# Patient Record
Sex: MALE | HISPANIC OR LATINO | Employment: FULL TIME | URBAN - METROPOLITAN AREA
[De-identification: names, ages, dates, MRNs, and addresses within clinical notes are randomized per-mention and may not be internally consistent; named-entity substitution may affect disease eponyms.]

---

## 2019-05-03 ENCOUNTER — TRANSFERRED RECORDS (OUTPATIENT)
Dept: HEALTH INFORMATION MANAGEMENT | Facility: CLINIC | Age: 59
End: 2019-05-03

## 2019-05-03 LAB
ALT SERPL-CCNC: 13 IU/L
AST SERPL-CCNC: 19 IU/L (ref 5–40)
CREAT SERPL-MCNC: 1.13 MG/DL (ref 0.7–1.25)
GFR SERPL CREATININE-BSD FRML MDRD: 70 ML/MIN/1.73M2
GLUCOSE SERPL-MCNC: 80 MG/DL (ref 70–100)
POTASSIUM SERPL-SCNC: 5 MEQ/L (ref 3.5–5.3)

## 2019-05-15 ENCOUNTER — HOSPITAL ENCOUNTER (INPATIENT)
Facility: CLINIC | Age: 59
LOS: 6 days | Discharge: HOME | DRG: 329 | End: 2019-05-22
Attending: EMERGENCY MEDICINE | Admitting: SURGERY
Payer: COMMERCIAL

## 2019-05-15 ENCOUNTER — APPOINTMENT (OUTPATIENT)
Dept: CT IMAGING | Facility: CLINIC | Age: 59
DRG: 329 | End: 2019-05-15
Attending: PHYSICIAN ASSISTANT
Payer: COMMERCIAL

## 2019-05-15 ENCOUNTER — APPOINTMENT (OUTPATIENT)
Dept: ULTRASOUND IMAGING | Facility: CLINIC | Age: 59
DRG: 329 | End: 2019-05-15
Attending: PHYSICIAN ASSISTANT
Payer: COMMERCIAL

## 2019-05-15 DIAGNOSIS — K25.2 ACUTE PERFORATED GASTRIC ULCER WITH HEMORRHAGE (H): Primary | ICD-10-CM

## 2019-05-15 DIAGNOSIS — K92.2 UPPER GI BLEED: ICD-10-CM

## 2019-05-15 DIAGNOSIS — K25.5: ICD-10-CM

## 2019-05-15 LAB
ALBUMIN SERPL-MCNC: 3.2 G/DL (ref 3.4–5)
ALP SERPL-CCNC: 86 U/L (ref 40–150)
ALT SERPL W P-5'-P-CCNC: 20 U/L (ref 0–70)
ANION GAP SERPL CALCULATED.3IONS-SCNC: 7 MMOL/L (ref 3–14)
APAP SERPL-MCNC: 3 MG/L (ref 10–20)
AST SERPL W P-5'-P-CCNC: 17 U/L (ref 0–45)
BASOPHILS # BLD AUTO: 0 10E9/L (ref 0–0.2)
BASOPHILS NFR BLD AUTO: 0.3 %
BILIRUB DIRECT SERPL-MCNC: 0.1 MG/DL (ref 0–0.2)
BILIRUB SERPL-MCNC: 0.4 MG/DL (ref 0.2–1.3)
BUN SERPL-MCNC: 35 MG/DL (ref 7–30)
CALCIUM SERPL-MCNC: 8.4 MG/DL (ref 8.5–10.1)
CHLORIDE SERPL-SCNC: 107 MMOL/L (ref 94–109)
CO2 SERPL-SCNC: 23 MMOL/L (ref 20–32)
CREAT SERPL-MCNC: 1.54 MG/DL (ref 0.66–1.25)
DIFFERENTIAL METHOD BLD: ABNORMAL
EOSINOPHIL # BLD AUTO: 0 10E9/L (ref 0–0.7)
EOSINOPHIL NFR BLD AUTO: 0.4 %
ERYTHROCYTE [DISTWIDTH] IN BLOOD BY AUTOMATED COUNT: 13.7 % (ref 10–15)
GFR SERPL CREATININE-BSD FRML MDRD: 48 ML/MIN/{1.73_M2}
GLUCOSE SERPL-MCNC: 144 MG/DL (ref 70–99)
HCT VFR BLD AUTO: 22.6 % (ref 40–53)
HGB BLD-MCNC: 7 G/DL (ref 13.3–17.7)
IMM GRANULOCYTES # BLD: 0 10E9/L (ref 0–0.4)
IMM GRANULOCYTES NFR BLD: 0.3 %
LACTATE BLD-SCNC: 1.2 MMOL/L (ref 0.7–2)
LIPASE SERPL-CCNC: 195 U/L (ref 73–393)
LYMPHOCYTES # BLD AUTO: 0.8 10E9/L (ref 0.8–5.3)
LYMPHOCYTES NFR BLD AUTO: 8.5 %
MCH RBC QN AUTO: 28.6 PG (ref 26.5–33)
MCHC RBC AUTO-ENTMCNC: 31 G/DL (ref 31.5–36.5)
MCV RBC AUTO: 92 FL (ref 78–100)
MONOCYTES # BLD AUTO: 0.3 10E9/L (ref 0–1.3)
MONOCYTES NFR BLD AUTO: 3.4 %
NEUTROPHILS # BLD AUTO: 7.8 10E9/L (ref 1.6–8.3)
NEUTROPHILS NFR BLD AUTO: 87.1 %
NRBC # BLD AUTO: 0 10*3/UL
NRBC BLD AUTO-RTO: 0 /100
PLATELET # BLD AUTO: 416 10E9/L (ref 150–450)
POTASSIUM SERPL-SCNC: 4.6 MMOL/L (ref 3.4–5.3)
PROT SERPL-MCNC: 7 G/DL (ref 6.8–8.8)
RBC # BLD AUTO: 2.45 10E12/L (ref 4.4–5.9)
SODIUM SERPL-SCNC: 137 MMOL/L (ref 133–144)
TROPONIN I SERPL-MCNC: <0.015 UG/L (ref 0–0.04)
WBC # BLD AUTO: 8.9 10E9/L (ref 4–11)

## 2019-05-15 PROCEDURE — 86923 COMPATIBILITY TEST ELECTRIC: CPT | Performed by: PHYSICIAN ASSISTANT

## 2019-05-15 PROCEDURE — 96376 TX/PRO/DX INJ SAME DRUG ADON: CPT

## 2019-05-15 PROCEDURE — 71260 CT THORAX DX C+: CPT

## 2019-05-15 PROCEDURE — 83690 ASSAY OF LIPASE: CPT | Performed by: PHYSICIAN ASSISTANT

## 2019-05-15 PROCEDURE — 93005 ELECTROCARDIOGRAM TRACING: CPT

## 2019-05-15 PROCEDURE — 76705 ECHO EXAM OF ABDOMEN: CPT

## 2019-05-15 PROCEDURE — 86900 BLOOD TYPING SEROLOGIC ABO: CPT | Performed by: PHYSICIAN ASSISTANT

## 2019-05-15 PROCEDURE — 86901 BLOOD TYPING SEROLOGIC RH(D): CPT | Performed by: PHYSICIAN ASSISTANT

## 2019-05-15 PROCEDURE — 99285 EMERGENCY DEPT VISIT HI MDM: CPT | Mod: 25

## 2019-05-15 PROCEDURE — 86850 RBC ANTIBODY SCREEN: CPT | Performed by: PHYSICIAN ASSISTANT

## 2019-05-15 PROCEDURE — 96374 THER/PROPH/DIAG INJ IV PUSH: CPT | Mod: 59

## 2019-05-15 PROCEDURE — 25000128 H RX IP 250 OP 636: Performed by: PHYSICIAN ASSISTANT

## 2019-05-15 PROCEDURE — 85025 COMPLETE CBC W/AUTO DIFF WBC: CPT | Performed by: PHYSICIAN ASSISTANT

## 2019-05-15 PROCEDURE — 80076 HEPATIC FUNCTION PANEL: CPT | Performed by: PHYSICIAN ASSISTANT

## 2019-05-15 PROCEDURE — 82272 OCCULT BLD FECES 1-3 TESTS: CPT | Performed by: PHYSICIAN ASSISTANT

## 2019-05-15 PROCEDURE — 36415 COLL VENOUS BLD VENIPUNCTURE: CPT | Performed by: PHYSICIAN ASSISTANT

## 2019-05-15 PROCEDURE — 74177 CT ABD & PELVIS W/CONTRAST: CPT

## 2019-05-15 PROCEDURE — 80048 BASIC METABOLIC PNL TOTAL CA: CPT | Performed by: PHYSICIAN ASSISTANT

## 2019-05-15 PROCEDURE — 83605 ASSAY OF LACTIC ACID: CPT | Performed by: PHYSICIAN ASSISTANT

## 2019-05-15 PROCEDURE — 80329 ANALGESICS NON-OPIOID 1 OR 2: CPT | Performed by: PHYSICIAN ASSISTANT

## 2019-05-15 PROCEDURE — 96361 HYDRATE IV INFUSION ADD-ON: CPT

## 2019-05-15 PROCEDURE — 84484 ASSAY OF TROPONIN QUANT: CPT | Performed by: PHYSICIAN ASSISTANT

## 2019-05-15 PROCEDURE — 96375 TX/PRO/DX INJ NEW DRUG ADDON: CPT

## 2019-05-15 RX ORDER — ATORVASTATIN CALCIUM 20 MG/1
20 TABLET, FILM COATED ORAL DAILY
Status: ON HOLD | COMMUNITY
End: 2019-05-16

## 2019-05-15 RX ORDER — AMLODIPINE BESYLATE 5 MG/1
5 TABLET ORAL DAILY
COMMUNITY

## 2019-05-15 RX ORDER — LISINOPRIL 40 MG/1
40 TABLET ORAL DAILY
Status: ON HOLD | COMMUNITY
End: 2019-05-22

## 2019-05-15 RX ORDER — ONDANSETRON 2 MG/ML
4 INJECTION INTRAMUSCULAR; INTRAVENOUS ONCE
Status: COMPLETED | OUTPATIENT
Start: 2019-05-15 | End: 2019-05-15

## 2019-05-15 RX ORDER — ACETAMINOPHEN 500 MG
500 TABLET ORAL EVERY 6 HOURS PRN
Status: ON HOLD | COMMUNITY
End: 2019-05-16

## 2019-05-15 RX ORDER — HYDROMORPHONE HYDROCHLORIDE 1 MG/ML
0.5 INJECTION, SOLUTION INTRAMUSCULAR; INTRAVENOUS; SUBCUTANEOUS ONCE
Status: COMPLETED | OUTPATIENT
Start: 2019-05-15 | End: 2019-05-15

## 2019-05-15 RX ADMIN — SODIUM CHLORIDE 1000 ML: 9 INJECTION, SOLUTION INTRAVENOUS at 22:44

## 2019-05-15 RX ADMIN — Medication 0.5 MG: at 22:49

## 2019-05-15 RX ADMIN — ONDANSETRON 4 MG: 2 INJECTION INTRAMUSCULAR; INTRAVENOUS at 22:46

## 2019-05-15 RX ADMIN — Medication 0.5 MG: at 23:42

## 2019-05-15 ASSESSMENT — ENCOUNTER SYMPTOMS
DIAPHORESIS: 0
NAUSEA: 0
ABDOMINAL PAIN: 1
COUGH: 0
SHORTNESS OF BREATH: 1
FEVER: 0
DIARRHEA: 0
MYALGIAS: 1
VOMITING: 0

## 2019-05-15 NOTE — LETTER
[address] , [address]  [place] [address]  [phone]  F: [phone]    [email or web address]  [email or web address]  [email or web address]               May 22, 2019    RE: Manny Manuel  :  1960      This is to certify that Manny Manuel has been under my care for surgery.  Please excuse him from work starting 19.    He will be able to return to work in approximately 4 weeks.  Please fax any necessary paperwork to our clinic:    Mat Castro MD  Fax:  [phone]      Sincerely,          Kirsten Ortega PA-C

## 2019-05-15 NOTE — Clinical Note
This patient and his wife work at the same place of employment.  Please fax this letter to their work place:    [employer]  Fax:  [phone]  Attn:  Jian Renner

## 2019-05-15 NOTE — LETTER
[address] , [address]  [place] [address]  [phone]  F: [phone]    [email or web address]  [email or web address]  [email or web address]               May 20, 2019    RE: Manny Manuel  :  1960      This is to certify that Manny Manuel has been under my care for emergency surgery.  Please excuse patient's wife (Milagros Elkins) from work starting 19 through today 19 as she was needed to help care for her .      Manny Manuel will be able to return to work in approximately 4 weeks.  Please fax any necessary paperwork to our clinic:    Mat Castro MD  Fax:  [phone]        Sincerely,        Kirsten Ortega PA-C

## 2019-05-16 ENCOUNTER — APPOINTMENT (OUTPATIENT)
Dept: GENERAL RADIOLOGY | Facility: CLINIC | Age: 59
DRG: 329 | End: 2019-05-16
Attending: PHYSICIAN ASSISTANT
Payer: COMMERCIAL

## 2019-05-16 ENCOUNTER — ANESTHESIA (OUTPATIENT)
Dept: SURGERY | Facility: CLINIC | Age: 59
DRG: 329 | End: 2019-05-16
Payer: COMMERCIAL

## 2019-05-16 ENCOUNTER — OFFICE VISIT (OUTPATIENT)
Dept: INTERPRETER SERVICES | Facility: CLINIC | Age: 59
End: 2019-05-16
Payer: COMMERCIAL

## 2019-05-16 ENCOUNTER — ANESTHESIA EVENT (OUTPATIENT)
Dept: SURGERY | Facility: CLINIC | Age: 59
DRG: 329 | End: 2019-05-16
Payer: COMMERCIAL

## 2019-05-16 PROBLEM — K25.2: Status: ACTIVE | Noted: 2019-05-16

## 2019-05-16 LAB
ABO + RH BLD: NORMAL
ABO + RH BLD: NORMAL
ANION GAP SERPL CALCULATED.3IONS-SCNC: 6 MMOL/L (ref 3–14)
BLD GP AB SCN SERPL QL: NORMAL
BLD PROD TYP BPU: NORMAL
BLOOD BANK CMNT PATIENT-IMP: NORMAL
BUN SERPL-MCNC: 24 MG/DL (ref 7–30)
CALCIUM SERPL-MCNC: 7.6 MG/DL (ref 8.5–10.1)
CHLORIDE SERPL-SCNC: 112 MMOL/L (ref 94–109)
CO2 SERPL-SCNC: 23 MMOL/L (ref 20–32)
CREAT SERPL-MCNC: 1.32 MG/DL (ref 0.66–1.25)
GFR SERPL CREATININE-BSD FRML MDRD: 58 ML/MIN/{1.73_M2}
GLUCOSE BLDC GLUCOMTR-MCNC: 105 MG/DL (ref 70–99)
GLUCOSE BLDC GLUCOMTR-MCNC: 107 MG/DL (ref 70–99)
GLUCOSE BLDC GLUCOMTR-MCNC: 125 MG/DL (ref 70–99)
GLUCOSE BLDC GLUCOMTR-MCNC: 131 MG/DL (ref 70–99)
GLUCOSE SERPL-MCNC: 143 MG/DL (ref 70–99)
HBA1C MFR BLD: 5.2 % (ref 0–5.6)
HEMOCCULT STL QL: POSITIVE
HGB BLD-MCNC: 7.5 G/DL (ref 13.3–17.7)
INTERPRETATION ECG - MUSE: NORMAL
NUM BPU REQUESTED: 2
POTASSIUM SERPL-SCNC: 4.6 MMOL/L (ref 3.4–5.3)
RADIOLOGIST FLAGS: ABNORMAL
SODIUM SERPL-SCNC: 141 MMOL/L (ref 133–144)
SPECIMEN EXP DATE BLD: NORMAL

## 2019-05-16 PROCEDURE — 25000128 H RX IP 250 OP 636: Performed by: EMERGENCY MEDICINE

## 2019-05-16 PROCEDURE — 99207 ZZC APP CREDIT; MD BILLING SHARED VISIT: CPT | Performed by: PHYSICIAN ASSISTANT

## 2019-05-16 PROCEDURE — 12000000 ZZH R&B MED SURG/OB

## 2019-05-16 PROCEDURE — 71000013 ZZH RECOVERY PHASE 1 LEVEL 1 EA ADDTL HR: Performed by: SURGERY

## 2019-05-16 PROCEDURE — 25000128 H RX IP 250 OP 636: Performed by: SURGERY

## 2019-05-16 PROCEDURE — 00000146 ZZHCL STATISTIC GLUCOSE BY METER IP

## 2019-05-16 PROCEDURE — 0DU907Z SUPPLEMENT DUODENUM WITH AUTOLOGOUS TISSUE SUBSTITUTE, OPEN APPROACH: ICD-10-PCS | Performed by: SURGERY

## 2019-05-16 PROCEDURE — C9113 INJ PANTOPRAZOLE SODIUM, VIA: HCPCS | Performed by: SURGERY

## 2019-05-16 PROCEDURE — 43840 GSTRRPHY SUTR DUOL/GSTR ULCR: CPT | Performed by: SURGERY

## 2019-05-16 PROCEDURE — 25800030 ZZH RX IP 258 OP 636: Performed by: SURGERY

## 2019-05-16 PROCEDURE — 71000012 ZZH RECOVERY PHASE 1 LEVEL 1 FIRST HR: Performed by: SURGERY

## 2019-05-16 PROCEDURE — 99222 1ST HOSP IP/OBS MODERATE 55: CPT | Performed by: INTERNAL MEDICINE

## 2019-05-16 PROCEDURE — 83036 HEMOGLOBIN GLYCOSYLATED A1C: CPT | Performed by: PHYSICIAN ASSISTANT

## 2019-05-16 PROCEDURE — 43752 NASAL/OROGASTRIC W/TUBE PLMT: CPT

## 2019-05-16 PROCEDURE — P9016 RBC LEUKOCYTES REDUCED: HCPCS | Performed by: PHYSICIAN ASSISTANT

## 2019-05-16 PROCEDURE — 37000009 ZZH ANESTHESIA TECHNICAL FEE, EACH ADDTL 15 MIN: Performed by: SURGERY

## 2019-05-16 PROCEDURE — 96375 TX/PRO/DX INJ NEW DRUG ADDON: CPT

## 2019-05-16 PROCEDURE — T1013 SIGN LANG/ORAL INTERPRETER: HCPCS | Mod: U3

## 2019-05-16 PROCEDURE — 27210794 ZZH OR GENERAL SUPPLY STERILE: Performed by: SURGERY

## 2019-05-16 PROCEDURE — 40000306 ZZH STATISTIC PRE PROC ASSESS II: Performed by: SURGERY

## 2019-05-16 PROCEDURE — 27211024 ZZHC OR SUPPLY OTHER OPNP: Performed by: SURGERY

## 2019-05-16 PROCEDURE — 36000056 ZZH SURGERY LEVEL 3 1ST 30 MIN: Performed by: SURGERY

## 2019-05-16 PROCEDURE — 36000058 ZZH SURGERY LEVEL 3 EA 15 ADDTL MIN: Performed by: SURGERY

## 2019-05-16 PROCEDURE — 25000125 ZZHC RX 250: Performed by: NURSE ANESTHETIST, CERTIFIED REGISTERED

## 2019-05-16 PROCEDURE — 43840 GSTRRPHY SUTR DUOL/GSTR ULCR: CPT | Mod: AS | Performed by: PHYSICIAN ASSISTANT

## 2019-05-16 PROCEDURE — 25800030 ZZH RX IP 258 OP 636: Performed by: NURSE ANESTHETIST, CERTIFIED REGISTERED

## 2019-05-16 PROCEDURE — C9113 INJ PANTOPRAZOLE SODIUM, VIA: HCPCS | Performed by: EMERGENCY MEDICINE

## 2019-05-16 PROCEDURE — 25000128 H RX IP 250 OP 636: Performed by: NURSE ANESTHETIST, CERTIFIED REGISTERED

## 2019-05-16 PROCEDURE — 80048 BASIC METABOLIC PNL TOTAL CA: CPT | Performed by: PHYSICIAN ASSISTANT

## 2019-05-16 PROCEDURE — 25800030 ZZH RX IP 258 OP 636: Performed by: ANESTHESIOLOGY

## 2019-05-16 PROCEDURE — 25000125 ZZHC RX 250

## 2019-05-16 PROCEDURE — 99221 1ST HOSP IP/OBS SF/LOW 40: CPT | Mod: 57 | Performed by: SURGERY

## 2019-05-16 PROCEDURE — 25000128 H RX IP 250 OP 636: Performed by: PHYSICIAN ASSISTANT

## 2019-05-16 PROCEDURE — 36415 COLL VENOUS BLD VENIPUNCTURE: CPT | Performed by: PHYSICIAN ASSISTANT

## 2019-05-16 PROCEDURE — 96374 THER/PROPH/DIAG INJ IV PUSH: CPT | Mod: 59

## 2019-05-16 PROCEDURE — 99207 ZZC DOWN CODE DUE TO INITIAL EXAM: CPT | Performed by: INTERNAL MEDICINE

## 2019-05-16 PROCEDURE — 25000125 ZZHC RX 250: Performed by: SURGERY

## 2019-05-16 PROCEDURE — 85018 HEMOGLOBIN: CPT | Performed by: PHYSICIAN ASSISTANT

## 2019-05-16 PROCEDURE — 25800030 ZZH RX IP 258 OP 636: Performed by: EMERGENCY MEDICINE

## 2019-05-16 PROCEDURE — 37000008 ZZH ANESTHESIA TECHNICAL FEE, 1ST 30 MIN: Performed by: SURGERY

## 2019-05-16 RX ORDER — ONDANSETRON 4 MG/1
4 TABLET, ORALLY DISINTEGRATING ORAL EVERY 6 HOURS PRN
Status: DISCONTINUED | OUTPATIENT
Start: 2019-05-16 | End: 2019-05-22 | Stop reason: HOSPADM

## 2019-05-16 RX ORDER — FENTANYL CITRATE 50 UG/ML
25-50 INJECTION, SOLUTION INTRAMUSCULAR; INTRAVENOUS
Status: DISCONTINUED | OUTPATIENT
Start: 2019-05-16 | End: 2019-05-16 | Stop reason: HOSPADM

## 2019-05-16 RX ORDER — SODIUM CHLORIDE, SODIUM LACTATE, POTASSIUM CHLORIDE, CALCIUM CHLORIDE 600; 310; 30; 20 MG/100ML; MG/100ML; MG/100ML; MG/100ML
INJECTION, SOLUTION INTRAVENOUS CONTINUOUS
Status: DISCONTINUED | OUTPATIENT
Start: 2019-05-16 | End: 2019-05-16 | Stop reason: HOSPADM

## 2019-05-16 RX ORDER — PROCHLORPERAZINE MALEATE 10 MG
10 TABLET ORAL EVERY 6 HOURS PRN
Status: DISCONTINUED | OUTPATIENT
Start: 2019-05-16 | End: 2019-05-22 | Stop reason: HOSPADM

## 2019-05-16 RX ORDER — SODIUM CHLORIDE 9 MG/ML
INJECTION, SOLUTION INTRAVENOUS CONTINUOUS
Status: DISCONTINUED | OUTPATIENT
Start: 2019-05-16 | End: 2019-05-19

## 2019-05-16 RX ORDER — PROPOFOL 10 MG/ML
INJECTION, EMULSION INTRAVENOUS PRN
Status: DISCONTINUED | OUTPATIENT
Start: 2019-05-16 | End: 2019-05-16

## 2019-05-16 RX ORDER — KETAMINE HYDROCHLORIDE 10 MG/ML
INJECTION, SOLUTION INTRAMUSCULAR; INTRAVENOUS PRN
Status: DISCONTINUED | OUTPATIENT
Start: 2019-05-16 | End: 2019-05-16

## 2019-05-16 RX ORDER — MEPERIDINE HYDROCHLORIDE 50 MG/ML
12.5 INJECTION INTRAMUSCULAR; INTRAVENOUS; SUBCUTANEOUS EVERY 5 MIN PRN
Status: DISCONTINUED | OUTPATIENT
Start: 2019-05-16 | End: 2019-05-16 | Stop reason: HOSPADM

## 2019-05-16 RX ORDER — IOPAMIDOL 755 MG/ML
500 INJECTION, SOLUTION INTRAVASCULAR ONCE
Status: COMPLETED | OUTPATIENT
Start: 2019-05-16 | End: 2019-05-16

## 2019-05-16 RX ORDER — ONDANSETRON 4 MG/1
4 TABLET, ORALLY DISINTEGRATING ORAL EVERY 30 MIN PRN
Status: DISCONTINUED | OUTPATIENT
Start: 2019-05-16 | End: 2019-05-16 | Stop reason: HOSPADM

## 2019-05-16 RX ORDER — DEXAMETHASONE SODIUM PHOSPHATE 4 MG/ML
INJECTION, SOLUTION INTRA-ARTICULAR; INTRALESIONAL; INTRAMUSCULAR; INTRAVENOUS; SOFT TISSUE PRN
Status: DISCONTINUED | OUTPATIENT
Start: 2019-05-16 | End: 2019-05-16

## 2019-05-16 RX ORDER — NALOXONE HYDROCHLORIDE 0.4 MG/ML
.1-.4 INJECTION, SOLUTION INTRAMUSCULAR; INTRAVENOUS; SUBCUTANEOUS
Status: DISCONTINUED | OUTPATIENT
Start: 2019-05-16 | End: 2019-05-22 | Stop reason: HOSPADM

## 2019-05-16 RX ORDER — LIDOCAINE 40 MG/G
CREAM TOPICAL
Status: DISCONTINUED | OUTPATIENT
Start: 2019-05-16 | End: 2019-05-16 | Stop reason: HOSPADM

## 2019-05-16 RX ORDER — NEOSTIGMINE METHYLSULFATE 1 MG/ML
VIAL (ML) INJECTION PRN
Status: DISCONTINUED | OUTPATIENT
Start: 2019-05-16 | End: 2019-05-16

## 2019-05-16 RX ORDER — DEXTROSE MONOHYDRATE 25 G/50ML
25-50 INJECTION, SOLUTION INTRAVENOUS
Status: DISCONTINUED | OUTPATIENT
Start: 2019-05-16 | End: 2019-05-22 | Stop reason: HOSPADM

## 2019-05-16 RX ORDER — LIDOCAINE HYDROCHLORIDE 20 MG/ML
JELLY TOPICAL ONCE
Status: COMPLETED | OUTPATIENT
Start: 2019-05-16 | End: 2019-05-16

## 2019-05-16 RX ORDER — HYDROMORPHONE HYDROCHLORIDE 1 MG/ML
.3-.5 INJECTION, SOLUTION INTRAMUSCULAR; INTRAVENOUS; SUBCUTANEOUS EVERY 5 MIN PRN
Status: DISCONTINUED | OUTPATIENT
Start: 2019-05-16 | End: 2019-05-16 | Stop reason: HOSPADM

## 2019-05-16 RX ORDER — GLYCOPYRROLATE 0.2 MG/ML
INJECTION, SOLUTION INTRAMUSCULAR; INTRAVENOUS PRN
Status: DISCONTINUED | OUTPATIENT
Start: 2019-05-16 | End: 2019-05-16

## 2019-05-16 RX ORDER — ONDANSETRON 2 MG/ML
4 INJECTION INTRAMUSCULAR; INTRAVENOUS EVERY 30 MIN PRN
Status: DISCONTINUED | OUTPATIENT
Start: 2019-05-16 | End: 2019-05-16 | Stop reason: HOSPADM

## 2019-05-16 RX ORDER — NAPROXEN SODIUM 220 MG
440 TABLET ORAL DAILY PRN
Status: ON HOLD | COMMUNITY
End: 2019-05-22

## 2019-05-16 RX ORDER — LIDOCAINE 40 MG/G
CREAM TOPICAL
Status: DISCONTINUED | OUTPATIENT
Start: 2019-05-16 | End: 2019-05-22 | Stop reason: HOSPADM

## 2019-05-16 RX ORDER — ONDANSETRON 2 MG/ML
4 INJECTION INTRAMUSCULAR; INTRAVENOUS EVERY 6 HOURS PRN
Status: DISCONTINUED | OUTPATIENT
Start: 2019-05-16 | End: 2019-05-22 | Stop reason: HOSPADM

## 2019-05-16 RX ORDER — FENTANYL CITRATE 50 UG/ML
INJECTION, SOLUTION INTRAMUSCULAR; INTRAVENOUS PRN
Status: DISCONTINUED | OUTPATIENT
Start: 2019-05-16 | End: 2019-05-16

## 2019-05-16 RX ORDER — ONDANSETRON 2 MG/ML
INJECTION INTRAMUSCULAR; INTRAVENOUS PRN
Status: DISCONTINUED | OUTPATIENT
Start: 2019-05-16 | End: 2019-05-16

## 2019-05-16 RX ORDER — PANTOPRAZOLE SODIUM 40 MG/10ML
40 INJECTION, POWDER, LYOPHILIZED, FOR SOLUTION INTRAVENOUS EVERY 12 HOURS
Status: DISCONTINUED | OUTPATIENT
Start: 2019-05-16 | End: 2019-05-22

## 2019-05-16 RX ORDER — METFORMIN HCL 500 MG
500 TABLET, EXTENDED RELEASE 24 HR ORAL DAILY
COMMUNITY

## 2019-05-16 RX ORDER — HYDRALAZINE HYDROCHLORIDE 20 MG/ML
2.5-5 INJECTION INTRAMUSCULAR; INTRAVENOUS EVERY 10 MIN PRN
Status: DISCONTINUED | OUTPATIENT
Start: 2019-05-16 | End: 2019-05-16 | Stop reason: HOSPADM

## 2019-05-16 RX ORDER — DIPHENHYDRAMINE HCL 25 MG
25 CAPSULE ORAL EVERY 6 HOURS PRN
Status: DISCONTINUED | OUTPATIENT
Start: 2019-05-16 | End: 2019-05-22 | Stop reason: HOSPADM

## 2019-05-16 RX ORDER — NALOXONE HYDROCHLORIDE 0.4 MG/ML
.1-.4 INJECTION, SOLUTION INTRAMUSCULAR; INTRAVENOUS; SUBCUTANEOUS
Status: DISCONTINUED | OUTPATIENT
Start: 2019-05-16 | End: 2019-05-16

## 2019-05-16 RX ORDER — MAGNESIUM HYDROXIDE 1200 MG/15ML
LIQUID ORAL PRN
Status: DISCONTINUED | OUTPATIENT
Start: 2019-05-16 | End: 2019-05-16 | Stop reason: HOSPADM

## 2019-05-16 RX ORDER — LIDOCAINE HYDROCHLORIDE 20 MG/ML
JELLY TOPICAL
Status: COMPLETED
Start: 2019-05-16 | End: 2019-05-16

## 2019-05-16 RX ORDER — DIPHENHYDRAMINE HYDROCHLORIDE 50 MG/ML
25 INJECTION INTRAMUSCULAR; INTRAVENOUS EVERY 6 HOURS PRN
Status: DISCONTINUED | OUTPATIENT
Start: 2019-05-16 | End: 2019-05-22 | Stop reason: HOSPADM

## 2019-05-16 RX ORDER — LIDOCAINE HYDROCHLORIDE 10 MG/ML
INJECTION, SOLUTION INFILTRATION; PERINEURAL PRN
Status: DISCONTINUED | OUTPATIENT
Start: 2019-05-16 | End: 2019-05-16

## 2019-05-16 RX ORDER — EPHEDRINE SULFATE 50 MG/ML
INJECTION, SOLUTION INTRAMUSCULAR; INTRAVENOUS; SUBCUTANEOUS PRN
Status: DISCONTINUED | OUTPATIENT
Start: 2019-05-16 | End: 2019-05-16

## 2019-05-16 RX ORDER — NICOTINE POLACRILEX 4 MG
15-30 LOZENGE BUCCAL
Status: DISCONTINUED | OUTPATIENT
Start: 2019-05-16 | End: 2019-05-22 | Stop reason: HOSPADM

## 2019-05-16 RX ORDER — HYDRALAZINE HYDROCHLORIDE 20 MG/ML
10 INJECTION INTRAMUSCULAR; INTRAVENOUS EVERY 4 HOURS PRN
Status: DISCONTINUED | OUTPATIENT
Start: 2019-05-16 | End: 2019-05-21

## 2019-05-16 RX ORDER — LABETALOL 20 MG/4 ML (5 MG/ML) INTRAVENOUS SYRINGE
10
Status: DISCONTINUED | OUTPATIENT
Start: 2019-05-16 | End: 2019-05-16 | Stop reason: HOSPADM

## 2019-05-16 RX ADMIN — SUGAMMADEX 200 MG: 100 INJECTION, SOLUTION INTRAVENOUS at 03:37

## 2019-05-16 RX ADMIN — PROPOFOL 50 MG: 10 INJECTION, EMULSION INTRAVENOUS at 03:15

## 2019-05-16 RX ADMIN — ONDANSETRON 4 MG: 2 INJECTION INTRAMUSCULAR; INTRAVENOUS at 03:06

## 2019-05-16 RX ADMIN — LIDOCAINE HYDROCHLORIDE 30 MG: 10 INJECTION, SOLUTION INFILTRATION; PERINEURAL at 02:10

## 2019-05-16 RX ADMIN — MIDAZOLAM 2 MG: 1 INJECTION INTRAMUSCULAR; INTRAVENOUS at 02:05

## 2019-05-16 RX ADMIN — TAZOBACTAM SODIUM AND PIPERACILLIN SODIUM 3.38 G: 375; 3 INJECTION, SOLUTION INTRAVENOUS at 06:20

## 2019-05-16 RX ADMIN — TAZOBACTAM SODIUM AND PIPERACILLIN SODIUM 3.38 G: 375; 3 INJECTION, SOLUTION INTRAVENOUS at 14:16

## 2019-05-16 RX ADMIN — KETAMINE HYDROCHLORIDE 30 MG: 10 INJECTION, SOLUTION INTRAMUSCULAR; INTRAVENOUS at 03:09

## 2019-05-16 RX ADMIN — Medication 3 MG: at 03:24

## 2019-05-16 RX ADMIN — ROCURONIUM BROMIDE 50 MG: 10 INJECTION INTRAVENOUS at 02:10

## 2019-05-16 RX ADMIN — GLYCOPYRROLATE 0.2 MG: 0.2 INJECTION, SOLUTION INTRAMUSCULAR; INTRAVENOUS at 02:10

## 2019-05-16 RX ADMIN — DIATRIZOATE MEGLUMINE AND DIATRIZOATE SODIUM 20 ML: 660; 100 SOLUTION ORAL; RECTAL at 12:50

## 2019-05-16 RX ADMIN — TAZOBACTAM SODIUM AND PIPERACILLIN SODIUM 3.38 G: 375; 3 INJECTION, SOLUTION INTRAVENOUS at 01:20

## 2019-05-16 RX ADMIN — PROPOFOL 150 MG: 10 INJECTION, EMULSION INTRAVENOUS at 02:10

## 2019-05-16 RX ADMIN — SODIUM CHLORIDE 80 MG: 9 INJECTION, SOLUTION INTRAVENOUS at 00:29

## 2019-05-16 RX ADMIN — SODIUM CHLORIDE: 9 INJECTION, SOLUTION INTRAVENOUS at 06:06

## 2019-05-16 RX ADMIN — LIDOCAINE HYDROCHLORIDE 1 TUBE: 20 JELLY TOPICAL at 12:49

## 2019-05-16 RX ADMIN — PANTOPRAZOLE SODIUM 40 MG: 40 INJECTION, POWDER, FOR SOLUTION INTRAVENOUS at 11:17

## 2019-05-16 RX ADMIN — TAZOBACTAM SODIUM AND PIPERACILLIN SODIUM 3.38 G: 375; 3 INJECTION, SOLUTION INTRAVENOUS at 19:41

## 2019-05-16 RX ADMIN — SODIUM CHLORIDE 60 ML: 9 INJECTION, SOLUTION INTRAVENOUS at 00:16

## 2019-05-16 RX ADMIN — IOPAMIDOL 80 ML: 755 INJECTION, SOLUTION INTRAVENOUS at 00:16

## 2019-05-16 RX ADMIN — DEXAMETHASONE SODIUM PHOSPHATE 4 MG: 4 INJECTION, SOLUTION INTRA-ARTICULAR; INTRALESIONAL; INTRAMUSCULAR; INTRAVENOUS; SOFT TISSUE at 02:10

## 2019-05-16 RX ADMIN — SODIUM CHLORIDE, POTASSIUM CHLORIDE, SODIUM LACTATE AND CALCIUM CHLORIDE: 600; 310; 30; 20 INJECTION, SOLUTION INTRAVENOUS at 02:05

## 2019-05-16 RX ADMIN — SODIUM CHLORIDE 8 MG/HR: 9 INJECTION, SOLUTION INTRAVENOUS at 01:16

## 2019-05-16 RX ADMIN — Medication 7.5 MG: at 02:25

## 2019-05-16 RX ADMIN — PHENYLEPHRINE HYDROCHLORIDE 100 MCG: 10 INJECTION, SOLUTION INTRAMUSCULAR; INTRAVENOUS; SUBCUTANEOUS at 02:28

## 2019-05-16 RX ADMIN — FENTANYL CITRATE 100 MCG: 50 INJECTION, SOLUTION INTRAMUSCULAR; INTRAVENOUS at 02:10

## 2019-05-16 RX ADMIN — Medication: at 05:11

## 2019-05-16 RX ADMIN — GLYCOPYRROLATE 0.4 MG: 0.2 INJECTION, SOLUTION INTRAMUSCULAR; INTRAVENOUS at 03:24

## 2019-05-16 RX ADMIN — SODIUM CHLORIDE: 9 INJECTION, SOLUTION INTRAVENOUS at 16:49

## 2019-05-16 RX ADMIN — FENTANYL CITRATE 100 MCG: 50 INJECTION, SOLUTION INTRAMUSCULAR; INTRAVENOUS at 02:31

## 2019-05-16 ASSESSMENT — ACTIVITIES OF DAILY LIVING (ADL)
SWALLOWING: 0-->SWALLOWS FOODS/LIQUIDS WITHOUT DIFFICULTY
ADLS_ACUITY_SCORE: 12
ADLS_ACUITY_SCORE: 12
FALL_HISTORY_WITHIN_LAST_SIX_MONTHS: NO
RETIRED_EATING: 0-->INDEPENDENT
BATHING: 0-->INDEPENDENT
TOILETING: 0-->INDEPENDENT
ADLS_ACUITY_SCORE: 10
TRANSFERRING: 0-->INDEPENDENT
AMBULATION: 0-->INDEPENDENT
COGNITION: 0 - NO COGNITION ISSUES REPORTED
RETIRED_COMMUNICATION: 0-->UNDERSTANDS/COMMUNICATES WITHOUT DIFFICULTY
ADLS_ACUITY_SCORE: 12
DRESS: 0-->INDEPENDENT

## 2019-05-16 NOTE — ANESTHESIA CARE TRANSFER NOTE
Patient: Manny Manuel    Procedure(s):  LAPAROTOMY, EXPLORATORY , over sewn and patching of gastric ulcer    Diagnosis: hoa ulcer  Diagnosis Additional Information: No value filed.    Anesthesia Type:   General, RSI, ETT     Note:  Airway :Face Mask  Patient transferred to:PACU  Handoff Report: Identifed the Patient, Identified the Reponsible Provider, Reviewed the pertinent medical history, Discussed the surgical course, Reviewed Intra-OP anesthesia mangement and issues during anesthesia, Set expectations for post-procedure period and Allowed opportunity for questions and acknowledgement of understanding      Vitals: (Last set prior to Anesthesia Care Transfer)    CRNA VITALS  5/16/2019 0309 - 5/16/2019 0348      5/16/2019             Pulse:  114    SpO2:  100 %    Resp Rate (observed):  21                Electronically Signed By: Dean Dennis Severson, APRN CRNA  May 16, 2019  3:48 AM

## 2019-05-16 NOTE — ANESTHESIA PREPROCEDURE EVALUATION
Anesthesia Pre-Procedure Evaluation    Patient: Manny Manuel   MRN: [id number] : 1960          Preoperative Diagnosis: hoa ulcer    Procedure(s):  LAPAROTOMY, EXPLORATORY    Past Medical History:   Diagnosis Date     Diabetes (H)      Hypertension      History reviewed. No pertinent surgical history.  Anesthesia Evaluation     . Pt has had prior anesthetic. Type: General    No history of anesthetic complications          ROS/MED HX    ENT/Pulmonary:  - neg pulmonary ROS     Neurologic:  - neg neurologic ROS     Cardiovascular:     (+) hypertension----. : . . . :. .       METS/Exercise Tolerance:     Hematologic:     (+) Anemia, -      Musculoskeletal:  - neg musculoskeletal ROS       GI/Hepatic:  - neg GI/hepatic ROS       Renal/Genitourinary:  - ROS Renal section negative       Endo:     (+) type II DM Not using insulin Obesity, .      Psychiatric:  - neg psychiatric ROS       Infectious Disease:  - neg infectious disease ROS       Malignancy:         Other:    - neg other ROS                      Physical Exam  Normal systems: cardiovascular, pulmonary and dental    Airway   Mallampati: II  TM distance: >3 FB  Neck ROM: full    Dental     Cardiovascular       Pulmonary             Lab Results   Component Value Date    WBC 8.9 05/15/2019    HGB 7.0 (L) 05/15/2019    HCT 22.6 (L) 05/15/2019     05/15/2019     05/15/2019    POTASSIUM 4.6 05/15/2019    CHLORIDE 107 05/15/2019    CO2 23 05/15/2019    BUN 35 (H) 05/15/2019    CR 1.54 (H) 05/15/2019     (H) 05/15/2019    LU 8.4 (L) 05/15/2019    ALBUMIN 3.2 (L) 05/15/2019    PROTTOTAL 7.0 05/15/2019    ALT 20 05/15/2019    AST 17 05/15/2019    ALKPHOS 86 05/15/2019    BILITOTAL 0.4 05/15/2019    LIPASE 195 05/15/2019       Preop Vitals  BP Readings from Last 3 Encounters:   19 142/74    Pulse Readings from Last 3 Encounters:   19 93      Resp Readings from Last 3 Encounters:   05/15/19 18    SpO2 Readings from Last 3 Encounters:    05/16/19 93%      Temp Readings from Last 1 Encounters:   05/15/19 97.8  F (36.6  C)    Ht Readings from Last 1 Encounters:   No data found for Ht      Wt Readings from Last 1 Encounters:   No data found for Wt    There is no height or weight on file to calculate BMI.       Anesthesia Plan      History & Physical Review  History and physical reviewed and following examination; no interval change.    ASA Status:  3 emergent.    NPO Status:  Waived due to emergency    Plan for General, RSI and ETT with Intravenous induction. Maintenance will be Balanced.    PONV prophylaxis:  Ondansetron (or other 5HT-3) and Dexamethasone or Solumedrol       Postoperative Care  Postoperative pain management:  IV analgesics, Oral pain medications and Multi-modal analgesia.      Consents  Anesthetic plan, risks, benefits and alternatives discussed with:  Patient..                 Marquez Holley MD                    .

## 2019-05-16 NOTE — ED PROVIDER NOTES
History     Chief Complaint:  Abdominal pain and chest pain    VITA Manuel is a 59 year old male with a history of hypertension and diabetes who presents to the ED for evaluation of abdominal pain and chest pain. The patient reports an onset of RUQ abdominal pain 10 days ago. He does have a gallbladder ultrasound scheduled for Friday but today the pain became unbearable under his ribs. Throughout the day, the pain has progressed and he is now reporting having pain everywhere throughout the chest and abdomen and is feeling short of breath. His skin is more yellow than normal today as well. He denies any fevers, diaphoresis, nausea, vomiting, diarrhea, cough, leg swelling, or any other symptoms. Denies any alcohol use though notes he was a heavy alcohol user years ago.  He does note that his stools have been darker than usual lately.  The patient is not on blood thinners.      CARDIAC RISK FACTORS:  Sex:    Male  Tobacco:   No  Hypertension:   Yes  Hyperlipidemia:  No  Diabetes:   Yes  Family History:  No    PE/DVT RISK FACTORS:  Sex:    Male  Hormones:   No  Tobacco:   No  Cancer:   No  Travel:   No  Surgery:   No  Other immobilization: No  Personal history:  No  Family history:  No    Allergies:  No known drug allergies    Medications:    Norvasc  Lipitor  Lisinopril     Past Medical History:    Diabetes   Hypertension     Past Surgical History:    History reviewed. No pertinent surgical history.    Family History:    History reviewed. No pertinent family history.     Social History:  Presents to the ED with his spouse.     Review of Systems   Constitutional: Negative for diaphoresis and fever.   Respiratory: Positive for shortness of breath. Negative for cough.    Cardiovascular: Positive for chest pain. Negative for leg swelling.   Gastrointestinal: Positive for abdominal pain. Negative for diarrhea, nausea and vomiting.   Musculoskeletal: Positive for myalgias.   All other systems reviewed and are  negative.      Physical Exam   Patient Vitals for the past 24 hrs:   BP Temp Pulse Heart Rate Resp SpO2   05/15/19 2300 144/72 -- 90 92 -- 98 %   05/15/19 2245 136/74 -- 85 77 -- 92 %   05/15/19 2230 123/66 -- 82 77 -- --   05/15/19 2215 -- -- -- 79 -- 99 %   05/15/19 2200 126/62 -- -- -- -- --   05/15/19 2154 (!) 83/48 97.8  F (36.6  C) 76 76 18 100 %     Physical Exam  General: Ill-appearing.  Lying on comfortably on gurney.  Skin pale and yellow appearing.  Head:  Scalp is NC/AT  Eyes:  No scleral icterus, PERRL  ENT:  The external nose and ears are normal.   Neck:  Normal range of motion without rigidity.  CV:  Regular rate and rhythm    No pathologic murmur, rubs, or gallops.  Resp:  Breath sounds are clear bilaterally.  No crackles, wheezes, rhonchi.    Non-labored, no retractions or accessory muscle use  GI:  Abdomen is tender to palpation in the epigastric and right upper quadrant areas.  There is some rebound tenderness and guarding.  Bowel sounds present in all quadrants.  Rectal examination performed in the presence of chaperone, which demonstrates presence of dark solid stool no gross bleeding.  :  No suprapubic or flank tenderness  MS:  No lower extremity edema.  symmetric calf swelling.  Skin:    Clammy and diaphoretic.  Neuro: Oriented. No gross motor deficits.  Psych: Awake. Alert. Normal affect. Appropriate interactions.      Emergency Department Course   ECG (22:05:02):  Rate 76 bpm. IA interval 114. QRS duration 88. QT/QTc 360/405. P-R-T axes 20 44 90. Normal sinus rhythm. Normal ECG. Interpreted at 2330 by Alfonso Sánchez MD.    Imaging:  Radiographic findings were communicated with the patient and family who voiced understanding of the findings.    US Abdomen, Limited (RUQ only):  IMPRESSION: Normal right upper quadrant. No gallstone or biliary  dilatation.  Results per radiology.    CT-scan Aortic Survey w/ contrast:  IMPRESSION:  1. Free intraperitoneal gas which appears to be due to  a perforated  ulcer in the distal stomach.  2. No aortic aneurysm or dissection.  3. Small amount of ascites.  [Critical Result: Unexplained pneumoperitoneum]  Preliminary result per radiology.     Laboratory:  Occult blood stool: Positive  ABO/Rh type and screen: O Pos, Neg antibody screen  CBC: HGB 7.0 (L) o/w WNL (WBC 8.9, )  Lipase: 195  Lactic Acid: 1.2  Acetaminophen level: 3  Hepatic panel: Albumin 3.2 (L) o/w WNL  BMP: Glucose 144 (H), BUN 35 (H), Creatinine 1.54 (H), GFR 48 (L), Calcium 8.4 (L) o/w WNL  2234 - Troponin: <0.015    Interventions:  2244: NS 1L IV Bolus  2246: Zofran 4mg IV injection   2249: Dilaudid 0.5 mg IV injection  2342: Dilaudid 0.5 mg IV injection  0029: Protonix 80 mg in  mL IV intermittent infusion    Emergency Department Course:  Past medical records, nursing notes, and vitals reviewed.  2206: I performed an exam of the patient and obtained history, as documented above. GCS 15.    IV inserted and blood drawn.    The patient was sent for an ultrasound and CT while in the emergency department, findings above.     Findings and plan explained to the Patient who consents to admission.     0045: Discussed the patient with Dr. Castro of general surgery, who will admit the patient to a surgical bed for further monitoring, evaluation, and treatment.     Impression & Plan      Medical Decision Makin-year-old male with past history of diabetes and hypertension who presents with abdominal pain.  Patient history and records reviewed.  On examination, the patient is ill-appearing.   He is most tender to palpation in the right upper quadrant and epigastric regions.  Initial concern was for gallbladder or liver pathology however ultrasound was negative.  Lab work demonstrated a hemoglobin of 7, elevated creatinine to 1.54.  Hemoccult positive for GI bleeding, though no gross bleed at this time apparent.  LFTs and bilirubin were within normal limits.   Given concern for abdominal  pain extending into the chest a CT aortic survey was ordered which demonstrated no evidence of aortic pathology but showed abdominal free air consistent with a likely perforated gastric ulcer. EKG not suggestive of ischemia and troponin negative.     The patient was started on proton pump inhibitors and Zosyn, and Dr. Castro was consulted from surgery who agreed to take the patient to the operating room.  The patient is vitally stable and safe for transfer at this time.    Diagnosis:    ICD-10-CM    1. Upper GI bleed K92.2 Occult blood stool   2.      Perforated gastric ulcer.  ABO/Rh type and screen       Disposition:  Admitted to Dr. Castro.      Jessica Arrington  5/15/2019   [facility] EMERGENCY DEPARTMENT  I, Jessica Arrington, am serving as a scribe at 10:06 PM on 5/15/2019 to document services personally performed by Sylvain Álvarez PA-C based on my observations and the provider's statements to me.      Sylvain Álvarez PA-C  05/16/19 0146

## 2019-05-16 NOTE — ADDENDUM NOTE
Addendum  created 05/16/19 0356 by Marquez Holley MD    Order list changed, Order sets accessed

## 2019-05-16 NOTE — PROGRESS NOTES
Patient arrived on floor at 0545  A/Ox4, Cuban speaking  POD# 0 LAPAROTOMY, EXPLORATORY  over sewn and patching of gastric ulcer  PCA for pain  C/O minimal pain  NPO, denies nausea  Minimal out of JOANNA  Disla draining yellow urine  Capno in place IPI 9-10 EtCO2 31-37  Sats 96% on 2L NC  Tmax 99.1  abd drgs C/D/I  Hgb 7.0, received 1unit PRBC in OR

## 2019-05-16 NOTE — PROGRESS NOTES
[facility]   General Surgery Progress Note           Assessment and Plan:   Assessment:   Acute upper GI Bleed due to perforated gastric ulcer  POD#0 s/p Procedure(s):  LAPAROTOMY, EXPLORATORY , over sewn and patching of gastric ulcer        Plan:   -NGT placement by Radiology today as soon as possible.  -Continue NPO  -pain control:  Dilaudid PCA  -abx:  IV Zosyn  -GI prophylaxis:  Protonix  -VTE prophylaxis:  PCDs  -appreciate hospitalist assistance         Interval History:   Patient seen with .  Patient comfortable in bed, PCA working well for pain and denies pain currently.  Also denies bloating or nausea.  Not hungry.  Walked from cart to patient bed earlier, plans to walk more today.  Disla in place.  NGT placed prior to surgery is no longer in place, RN and patient unaware of when NGT was removed.         Physical Exam:   Blood pressure 106/56, pulse 78, temperature 98.4  F (36.9  C), temperature source Oral, resp. rate 20, weight 77.7 kg (171 lb 3.2 oz), SpO2 95 %.    I/O last 3 completed shifts:  In: 900 [I.V.:600]  Out: 1130 [Urine:400; Drains:30; Other:700]    Abdomen:   soft, rounded/distended, tenderness noted in the epigastric region and in the right upper quadrant and absent bowel sounds   Inc(s) -dressings intact      [place] - serosanguinous          Data:     Recent Labs   Lab 05/15/19  2234   HGB 7.0*     Recent Labs   Lab 05/15/19  2234   WBC 8.9       Kirsten Ortega PA-C     The patient reports feeling quite well.  A family member is acting as .  In spite of the fact that a nasogastric tube was placed during surgery yesterday, the patient did not have a nasogastric tube in this morning.  This was therefore replaced by radiology.  He has had relatively little output from it.  Overall, the patient is doing well.  I would like to obtain an upper GI with water-soluble contrast on Monday to evaluate for any ongoing leak.    Mat Castro MD  [facility]

## 2019-05-16 NOTE — PLAN OF CARE
Patient alert and oriented x4, using  and phone when talking with patient. Patient denied pain most of shift, minimal pain with walking. Up Ax1, walking in halls. NG tube placed in IR this afternoon, 25cc out. Using IS. JOANNA in place and striped, abdominal binder ordered. Q4hr BS checks, 131 this shift. Hgb 7.5 this AM, MD aware. NPO with ice chips. Capno in place, on RA, sats in the mid 90s. Discharge pending.

## 2019-05-16 NOTE — ED PROVIDER NOTES
Emergency Department Attending Supervision Note  5/15/2019  11:32 PM      I evaluated this patient in conjunction with IZABELLA Ramirez      Briefly, the patient presented with epigastric and right upper quadrant abdominal pain, severe, nonradiating with associated black bowel movements.      On my exam, epigastric and upper quadrant abdominal pain, patient somewhat ill-appearing, conjunctival pallor, 2+ bilateral radial and DP pulses.  CHARLES observed in conjunction with PA and notable for black stool.    Results:  Labs Ordered and Resulted from Time of ED Arrival Up to the Time of Departure from the ED   CBC WITH PLATELETS DIFFERENTIAL - Abnormal; Notable for the following components:       Result Value    RBC Count 2.45 (*)     Hemoglobin 7.0 (*)     Hematocrit 22.6 (*)     MCHC 31.0 (*)     All other components within normal limits   HEPATIC PANEL - Abnormal; Notable for the following components:    Albumin 3.2 (*)     All other components within normal limits   BASIC METABOLIC PANEL - Abnormal; Notable for the following components:    Glucose 144 (*)     Urea Nitrogen 35 (*)     Creatinine 1.54 (*)     GFR Estimate 48 (*)     GFR Estimate If Black 55 (*)     Calcium 8.4 (*)     All other components within normal limits   OCCULT BLOOD STOOL - Abnormal; Notable for the following components:    Occult Blood Positive (*)     All other components within normal limits   LIPASE   LACTIC ACID WHOLE BLOOD   ACETAMINOPHEN LEVEL   TROPONIN I       ED course: Patient initially hypotensive but responded to crystalloid.  Given likely upper GI bleed given CHARLES, pantoprazole ordered.  CT imaging revealed perforated gastric ulcer.  The patient was subsequently given Zosyn and taken to the operating room emergently with general surgery for definitive operative management.    Critical care time 30 minutes    My impression is perforated gastric ulcer        Diagnosis    ICD-10-CM    1. Upper GI bleed K92.2 Occult blood stool     ABO/Rh  type and screen     Glucose by meter     Glucose by meter     Blood component     Blood component     Blood component     Blood component     Basic metabolic panel     Hemoglobin     Hemoglobin A1c     Glucose by meter     Glucose by meter     Glucose by meter     Glucose by meter     Glucose by meter     Glucose by meter     CBC with platelets differential     Basic metabolic panel     Glucose by meter     Glucose by meter     Glucose by meter     Glucose by meter     Lactic acid level STAT for sepsis protocol     Glucose by meter     Glucose by meter     Hemoglobin     Glucose by meter     Glucose by meter     Glucose by meter     Glucose by meter     Hemoglobin     CANCELED: Hemoglobin   2. Perforated gastric ulcer (H) K25.5          No att. providers found          Alfonso Sánchez MD  05/1960

## 2019-05-16 NOTE — OR NURSING
Patient assessment not completed due to emergent surgery.  Phone  Jaleel #[phone] used for conversation with DR Castro, Dr Holley, and pre-op RN. To inform and consent for surgery. Consent agreed and signed

## 2019-05-16 NOTE — CONSULTS
[facility] Hospitalist Consult     Manny Manuel MRN# [id number]   YOB: 1960 Age: 59 year old   Date of Admission: 5/15/2019  PCP is [facility]  Date of Service: 5/16/2019    Referring MD & Reason for Visit: I was asked by Mat Castro MD, to manage chronic medical problems.  Internal Medicine Physician Assistant: Jessica Mcmahon         Assessment and Plan:   Manny Manuel is a 59 year old male  with a history of HTN, DM Type 2 who presented to the ED on 5/15/19 with abdominal and chest pain and found to have a perforated gastric ulcer.  General Surgery admitted the patient and he underwent an exploratory laparotomy with oversew and patching of a perforated gastric ulcer.  Internal Medicine service was asked to see for management of chronic medical problems.     Acute Upper GI Bleed 2/2 Perforated Gastric Ulcer s/p Exploratory laparotomy, oversew and patching - POD #1.  Likely 2/2 hx NSAID use. Doing well.  No stools overnight reported.  Hgb on admission 7.0 s/p 1 unit PRBC transfusion in the OR.    - will defer diet, activity, DVT ppx, and pain control to primary team.   - start serial hgb monitoring  - continue IV Protonix  - continue IV Zosyn per surgery    HTN - bp well controlled. On Lisinopril and Amlodipine pta.  - hold home po medications.  Start IV Hydralazine prn  - resume home antihypertensives with able to take po per surgery.    DM Type 2 - most recent A1C 12/2018 6.0.  On Metformin pta  - hold Metformin  - start npo ISS protocol    ESMER - creatinine on admission 1.54 likely 2/2 hypovolemia from GIB.  Also on Lisinopril pta.  Baseline creatinine 1.1  - continue IVF hydration  - repeat BMP now      CODE: Full  Diet/IVF: npo, NS  GI ppx:  protonix  DVT ppx: scd      Jessica Mcmahon MS PA-C  Internal Medicine Physician Assistant  [facility]  Pager: [phone]           Chief Complaint:   Abdominal Pain         HPI:   History is obtained from the  patient and medical record. This patient is a 59 year old male with a history of HTN, DM Type 2 who presented to the ED on 5/15/19 with abdominal and chest pain and found to have a perforated gastric ulcer.  General Surgery admitted the patient and he underwent an exploratory laparotomy with oversew and patching of a perforated gastric ulcer.  Internal Medicine service was asked to see for management of chronic medical problems.     Interview conducted with a professional Burmese speaking interpretor.  Patient reports he has had abdominal pain and black colored stools for 15 days prior to admission.  He reports he feels much improved today.  Denies any nausea, emesis, chest pain or shortness of breath.  He feels some increased abdominal pain when trying to cough.  He has been taking Aleve twice a day for aches and pains.         Past Medical History:     Past Medical History:   Diagnosis Date     Diabetes (H)      Hypertension           Past Surgical History:   History reviewed. No pertinent surgical history.       Social History:     Social History     Socioeconomic History     Marital status:      Spouse name: Not on file     Number of children: Not on file     Years of education: Not on file     Highest education level: Not on file   Occupational History     Not on file   Social Needs     Financial resource strain: Not on file     Food insecurity:     Worry: Not on file     Inability: Not on file     Transportation needs:     Medical: Not on file     Non-medical: Not on file   Tobacco Use     Smoking status: Never Smoker     Smokeless tobacco: Never Used   Substance and Sexual Activity     Alcohol use: Not on file     Drug use: Not on file     Sexual activity: Not on file   Lifestyle     Physical activity:     Days per week: Not on file     Minutes per session: Not on file     Stress: Not on file   Relationships     Social connections:     Talks on phone: Not on file     Gets together: Not on file      Attends Cheondoism service: Not on file     Active member of club or organization: Not on file     Attends meetings of clubs or organizations: Not on file     Relationship status: Not on file     Intimate partner violence:     Fear of current or ex partner: Not on file     Emotionally abused: Not on file     Physically abused: Not on file     Forced sexual activity: Not on file   Other Topics Concern     Parent/sibling w/ CABG, MI or angioplasty before 65F 55M? Not Asked   Social History Narrative     Not on file          Family History:   History reviewed. No pertinent family history.       Allergies:    No Known Allergies       Medications:     Prior to Admission medications    Medication Sig Last Dose Taking? Auth Provider   amLODIPine (NORVASC) 5 MG tablet Take 5 mg by mouth daily 5/15/2019 at Unknown time Yes Reported, Patient   lisinopril (PRINIVIL/ZESTRIL) 40 MG tablet Take 40 mg by mouth daily 5/15/2019 at Unknown time Yes Reported, Patient   metFORMIN (GLUCOPHAGE-XR) 500 MG 24 hr tablet Take 500 mg by mouth daily 5/15/2019 at Unknown time Yes Unknown, Entered By History   naproxen sodium (ANAPROX) 220 MG tablet Take 440 mg by mouth daily as needed for moderate pain 5/15/2019 at Unknown time Yes Unknown, Entered By History          Review of Systems:   A complete ROS was performed and is negative other than what is stated in the HPI.       Physical Exam:   Blood pressure 106/61, pulse 78, temperature 98.4  F (36.9  C), temperature source Oral, resp. rate 20, weight 77.7 kg (171 lb 3.2 oz), SpO2 96 %.  General: Alert, interactive, NAD, lying in bed, pleasant and cooperative.  Chest/Resp: clear to auscultation bilaterally, no crackles or wheezes  Heart/CV: regular rate and rhythm, no murmur  Abdomen/GI: Soft, mild epigastric and RUQ tenderness.  Epigastric bandage in place c/d/i.  RUQ drain in place with serosanguinous drainage, nondistended. +BS.  No rebound or guarding.  Extremities/MSK: No LE edema  Skin:  Warm and dry  Neuro: Alert & oriented,moves all extremities equally         Labs:   ROUTINE ICU LABS (Last four results)  CMP  Recent Labs   Lab 05/15/19  2234      POTASSIUM 4.6   CHLORIDE 107   CO2 23   ANIONGAP 7   *   BUN 35*   CR 1.54*   GFRESTIMATED 48*   GFRESTBLACK 55*   LU 8.4*   PROTTOTAL 7.0   ALBUMIN 3.2*   BILITOTAL 0.4   ALKPHOS 86   AST 17   ALT 20     CBC  Recent Labs   Lab 05/15/19  2234   WBC 8.9   RBC 2.45*   HGB 7.0*   HCT 22.6*   MCV 92   MCH 28.6   MCHC 31.0*   RDW 13.7

## 2019-05-16 NOTE — ANESTHESIA POSTPROCEDURE EVALUATION
Patient: Manny Manuel    Procedure(s):  LAPAROTOMY, EXPLORATORY , over sewn and patching of gastric ulcer    Diagnosis:hoa ulcer  Diagnosis Additional Information: Pre-operative diagnosis:  Perforated ulcer  Post-operative diagnosis:  Perforated gastric ulcer  Procedure:  Exploratory laparotomy, oversew and patching of perforated gastric ulcer.          Anesthesia Type:  General, RSI, ETT    Note:  Anesthesia Post Evaluation    Patient location during evaluation: PACU  Patient participation: Able to fully participate in evaluation  Level of consciousness: awake and alert  Pain management: adequate  Airway patency: patent  Cardiovascular status: acceptable  Respiratory status: acceptable  Hydration status: acceptable  PONV: none     Anesthetic complications: None          Last vitals:  Vitals:    05/16/19 0115 05/16/19 0154 05/16/19 0341   BP: 142/74 143/79 129/85   Pulse: 93  109   Resp:  18 19   Temp:  99.4  F (37.4  C)    SpO2: 93% 94% 92%         Electronically Signed By: Marquez Holley MD  May 16, 2019  3:53 AM

## 2019-05-16 NOTE — OP NOTE
General Surgery Operative Note    Pre-operative diagnosis:  Perforated ulcer   Post-operative diagnosis:  Perforated gastric ulcer   Procedure:  Exploratory laparotomy, oversew and patching of perforated gastric ulcer.   Surgeon: Mat Castro MD   Assistant(s): Raad Marcano PA-C  - the PA's assistance was medically necessary in providing adequate exposure in the operating field, maintaining hemostasis, cuttting suture, clamping and ligating blood vessels, and visualization of the anatomic structures throughout the surgical procedure.   Anesthesia: General    Estimated blood loss: 20 cc's   Drains placed: Eugene   Complications:  None   Findings:   Perforated ulcer at the superior aspect of the antrum just proximal to pylorus.  The superior edge of the ulceration consisted of fibrotic fat while the inferior portion consisted of gastric tissue.  This was loosely closed to reduce leakage and then was patched using an omental patch.  A drain was then placed over the area.     Indications for operation: This is a 59-year-old gentleman who presents with a 15-day history of upper abdominal pain.  This became more severe today and he presented to the emergency room.  He had previously been seen in his primary care clinic and was undergoing work-up for possible gallbladder issues.  Ultrasound in the emergency room revealed no gallbladder abnormalities.  CT scan was therefore performed and this showed free air in the abdomen.  Urgent operation was therefore recommended.  The procedure, along with his risks and complications, was discussed with the patient with the assistance of a phone .  He agreed to proceed.    Details of the operation: After informed consent, the patient was taken to the operating room where he underwent satisfactory induction of general anesthesia.  The patient was sterilely prepped and draped in an upper midline incision was made.  A wound protector was placed and a fair amount of  cloudy fluid was identified.  As this was suctioned out, it became clear that this was coming from a perforated ulcer just proximal to the pylorus.  This was coming out fairly superiorly with the superior aspect of the ulcer assisting the fibrotic fat while the inferior portion was fairly normal looking gastric tissue.  Due to the location of the ulcer, it was not felt that a biopsy could be adequately taken.  0 Vicryl sutures were now used to gently close the ulcer down and stop the leakage.  The area was then irrigated out and an omental tongue was tied down over the area of the repair.  The abdomen was now irrigated out using normal saline and antibiotic solution.  Position of the nasogastric tube was confirmed within the stomach.  A 19 Zambian round Eugene drain was placed through a right-sided stab incision and laid across the repair.  There was no active leakage from the area.  The drain was sutured in place at the skin and the fascia was now closed using a running looped 0 PDS suture.  The subcutaneous tissue was irrigated out and the skin was closed loosely using skin staples.    The patient tolerated the procedure well and was transferred to the recovery room in satisfactory condition.  Sponge and needle counts were correct at the close of the case.    Specimens: * No specimens in log *        Mat Castro MD

## 2019-05-16 NOTE — PROGRESS NOTES
RADIOLOGY PROCEDURE NOTE  Patient name: Manny Manuel  MRN: [id number]  : 1960    Pre-procedure diagnosis: NG tube requested  Post-procedure diagnosis: Same    Procedure Date/Time: May 16, 2019  12:51 PM  Procedure: NG tube placement  Estimated blood loss: None  Specimen(s) collected with description: none  The patient tolerated the procedure well with no immediate complications.  Significant findings:none    See imaging dictation for procedural details.    Provider name: Devaughn Baxter  Assistant(s):None

## 2019-05-16 NOTE — H&P
[facility]  [facility] - H&P     Manny Manuel MRN# [id number]   Age: 59 year old YOB: 1960     HPI:  Patient has been experiencing upper abdominal pain for the last 15 days.  This worsened today to the point where the patient could not stand anymore.  He had actually been seen at his primary care clinic several days ago, and an ultrasound had been ordered looking for gallbladder issues.  The patient feels his skin is more yellow than normal today.  He denies fevers, nausea or vomiting.  He denies any blood in his stool in general, but he does feel his stools have been a bit darker than usual lately.  Patient does have a history of heavy alcohol use in the distant past, but denies that currently.  He has diabetes but does not take insulin.  History is obtained from the patient with the assistance of an  phone.    Review Of Systems:  Respiratory: No shortness of breath, dyspnea on exertion, cough, or hemoptysis  Cardiovascular: negative  Gastrointestinal: as above  Genitourinary: negative  All remaining review of systems negative except as stated in HPI.    PMH:  Past Medical History:   Diagnosis Date     Diabetes (H)      Hypertension        PSH:  No past surgical history on file.    Allergies:  No Known Allergies    Home Medications:  No current outpatient medications on file.       Social History:  .  The patient presents today with his spouse.    Family History:    History reviewed.  No pertinent family history.    Physical Exam:  /74   Pulse 93   Temp 97.8  F (36.6  C)   Resp 18   SpO2 93%     General appearance: healthy, alert and mild distress.  Hydration: well hydrated  HEENT: normocephalic, atraumatic  Neck: no adenopathy  Lungs: normal and clear to auscultation  Heart: regular rate and rhythm and no murmurs, clicks, or gallops  Abdomen: rounded, hypoactive bowel sounds. Tenderness: present: In the upper abdomen.  The lower abdomen is  essentially nontender.    Skin: clear without rashes/lesions  Extremities: no gross deformities  Neuro: oriented to time & place, moves all extremities with normal strength, speech clear    Labs Reviewed:  Lab Results   Component Value Date    WBC 8.9 05/15/2019     Lab Results   Component Value Date    HGB 7.0 05/15/2019     Lab Results   Component Value Date     05/15/2019       Last Basic Metabolic Panel:  Lab Results   Component Value Date     05/15/2019      Lab Results   Component Value Date    POTASSIUM 4.6 05/15/2019     Lab Results   Component Value Date    CHLORIDE 107 05/15/2019     Lab Results   Component Value Date    LU 8.4 05/15/2019     Lab Results   Component Value Date    CO2 23 05/15/2019     Lab Results   Component Value Date    BUN 35 05/15/2019     Lab Results   Component Value Date    CR 1.54 05/15/2019     Lab Results   Component Value Date     05/15/2019         Radiology:  There is free intraperitoneal gas and findings consistent with a perforated ulcer in the distal stomach.    ASSESSMENT/PLAN:  This is a patient with a probable perforated ulcer.  I have recommended exploratory laparotomy on an urgent basis.  We discussed the procedure, along with its risks and complications with the assistance of the  over the phone.  The patient has agreed to proceed.  All of his questions were answered.    Mat Castro MD

## 2019-05-16 NOTE — ED NOTES
Report called to PACU. Pt voided, second line started, IV antibiotics given and Protonix infusion began.

## 2019-05-16 NOTE — ED TRIAGE NOTES
Patient presents with complaints of abdomen pain which extends into his chest. Patient also states that it is hard to breath as well. ABC intact without need for intervention at this time.

## 2019-05-17 ENCOUNTER — OFFICE VISIT (OUTPATIENT)
Dept: INTERPRETER SERVICES | Facility: CLINIC | Age: 59
End: 2019-05-17
Payer: COMMERCIAL

## 2019-05-17 LAB
ANION GAP SERPL CALCULATED.3IONS-SCNC: 6 MMOL/L (ref 3–14)
BASOPHILS # BLD AUTO: 0 10E9/L (ref 0–0.2)
BASOPHILS NFR BLD AUTO: 0.2 %
BLD PROD TYP BPU: NORMAL
BLD PROD TYP BPU: NORMAL
BLD UNIT ID BPU: 0
BLD UNIT ID BPU: 0
BLOOD PRODUCT CODE: NORMAL
BLOOD PRODUCT CODE: NORMAL
BPU ID: NORMAL
BPU ID: NORMAL
BUN SERPL-MCNC: 24 MG/DL (ref 7–30)
CALCIUM SERPL-MCNC: 7.6 MG/DL (ref 8.5–10.1)
CHLORIDE SERPL-SCNC: 117 MMOL/L (ref 94–109)
CO2 SERPL-SCNC: 22 MMOL/L (ref 20–32)
CREAT SERPL-MCNC: 1.52 MG/DL (ref 0.66–1.25)
DIFFERENTIAL METHOD BLD: ABNORMAL
EOSINOPHIL # BLD AUTO: 0 10E9/L (ref 0–0.7)
EOSINOPHIL NFR BLD AUTO: 0.1 %
ERYTHROCYTE [DISTWIDTH] IN BLOOD BY AUTOMATED COUNT: 14.6 % (ref 10–15)
GFR SERPL CREATININE-BSD FRML MDRD: 49 ML/MIN/{1.73_M2}
GLUCOSE BLDC GLUCOMTR-MCNC: 103 MG/DL (ref 70–99)
GLUCOSE BLDC GLUCOMTR-MCNC: 114 MG/DL (ref 70–99)
GLUCOSE BLDC GLUCOMTR-MCNC: 72 MG/DL (ref 70–99)
GLUCOSE BLDC GLUCOMTR-MCNC: 94 MG/DL (ref 70–99)
GLUCOSE BLDC GLUCOMTR-MCNC: 98 MG/DL (ref 70–99)
GLUCOSE SERPL-MCNC: 98 MG/DL (ref 70–99)
HCT VFR BLD AUTO: 21.6 % (ref 40–53)
HGB BLD-MCNC: 6.6 G/DL (ref 13.3–17.7)
HGB BLD-MCNC: 9.6 G/DL (ref 13.3–17.7)
HGB BLD-MCNC: 9.9 G/DL (ref 13.3–17.7)
IMM GRANULOCYTES # BLD: 0.1 10E9/L (ref 0–0.4)
IMM GRANULOCYTES NFR BLD: 0.8 %
LACTATE BLD-SCNC: 0.9 MMOL/L (ref 0.7–2)
LYMPHOCYTES # BLD AUTO: 1.8 10E9/L (ref 0.8–5.3)
LYMPHOCYTES NFR BLD AUTO: 10.2 %
MCH RBC QN AUTO: 28.6 PG (ref 26.5–33)
MCHC RBC AUTO-ENTMCNC: 30.6 G/DL (ref 31.5–36.5)
MCV RBC AUTO: 94 FL (ref 78–100)
MONOCYTES # BLD AUTO: 1.2 10E9/L (ref 0–1.3)
MONOCYTES NFR BLD AUTO: 7 %
NEUTROPHILS # BLD AUTO: 14.2 10E9/L (ref 1.6–8.3)
NEUTROPHILS NFR BLD AUTO: 81.7 %
NRBC # BLD AUTO: 0 10*3/UL
NRBC BLD AUTO-RTO: 0 /100
PLATELET # BLD AUTO: 340 10E9/L (ref 150–450)
POTASSIUM SERPL-SCNC: 4.4 MMOL/L (ref 3.4–5.3)
RBC # BLD AUTO: 2.31 10E12/L (ref 4.4–5.9)
SODIUM SERPL-SCNC: 145 MMOL/L (ref 133–144)
TRANSFUSION STATUS PATIENT QL: NORMAL
WBC # BLD AUTO: 17.3 10E9/L (ref 4–11)

## 2019-05-17 PROCEDURE — 25000128 H RX IP 250 OP 636: Performed by: SURGERY

## 2019-05-17 PROCEDURE — 36415 COLL VENOUS BLD VENIPUNCTURE: CPT | Performed by: INTERNAL MEDICINE

## 2019-05-17 PROCEDURE — 99233 SBSQ HOSP IP/OBS HIGH 50: CPT | Performed by: INTERNAL MEDICINE

## 2019-05-17 PROCEDURE — 83605 ASSAY OF LACTIC ACID: CPT | Performed by: INTERNAL MEDICINE

## 2019-05-17 PROCEDURE — 00000146 ZZHCL STATISTIC GLUCOSE BY METER IP

## 2019-05-17 PROCEDURE — 25800030 ZZH RX IP 258 OP 636: Performed by: INTERNAL MEDICINE

## 2019-05-17 PROCEDURE — 80048 BASIC METABOLIC PNL TOTAL CA: CPT | Performed by: SURGERY

## 2019-05-17 PROCEDURE — 12000000 ZZH R&B MED SURG/OB

## 2019-05-17 PROCEDURE — 85018 HEMOGLOBIN: CPT | Performed by: SURGERY

## 2019-05-17 PROCEDURE — 85025 COMPLETE CBC W/AUTO DIFF WBC: CPT | Performed by: SURGERY

## 2019-05-17 PROCEDURE — C9113 INJ PANTOPRAZOLE SODIUM, VIA: HCPCS | Performed by: SURGERY

## 2019-05-17 PROCEDURE — 25800030 ZZH RX IP 258 OP 636: Performed by: SURGERY

## 2019-05-17 PROCEDURE — T1013 SIGN LANG/ORAL INTERPRETER: HCPCS | Mod: U3

## 2019-05-17 PROCEDURE — P9016 RBC LEUKOCYTES REDUCED: HCPCS | Performed by: PHYSICIAN ASSISTANT

## 2019-05-17 PROCEDURE — 85018 HEMOGLOBIN: CPT | Performed by: INTERNAL MEDICINE

## 2019-05-17 PROCEDURE — 36415 COLL VENOUS BLD VENIPUNCTURE: CPT | Performed by: SURGERY

## 2019-05-17 RX ORDER — DEXTROSE MONOHYDRATE, SODIUM CHLORIDE, AND POTASSIUM CHLORIDE 50; 1.49; 4.5 G/1000ML; G/1000ML; G/1000ML
INJECTION, SOLUTION INTRAVENOUS CONTINUOUS
Status: DISCONTINUED | OUTPATIENT
Start: 2019-05-17 | End: 2019-05-19

## 2019-05-17 RX ADMIN — SODIUM CHLORIDE: 9 INJECTION, SOLUTION INTRAVENOUS at 03:23

## 2019-05-17 RX ADMIN — PANTOPRAZOLE SODIUM 40 MG: 40 INJECTION, POWDER, FOR SOLUTION INTRAVENOUS at 13:13

## 2019-05-17 RX ADMIN — TAZOBACTAM SODIUM AND PIPERACILLIN SODIUM 3.38 G: 375; 3 INJECTION, SOLUTION INTRAVENOUS at 19:40

## 2019-05-17 RX ADMIN — TAZOBACTAM SODIUM AND PIPERACILLIN SODIUM 3.38 G: 375; 3 INJECTION, SOLUTION INTRAVENOUS at 06:30

## 2019-05-17 RX ADMIN — POTASSIUM CHLORIDE, DEXTROSE MONOHYDRATE AND SODIUM CHLORIDE: 150; 5; 450 INJECTION, SOLUTION INTRAVENOUS at 13:21

## 2019-05-17 RX ADMIN — TAZOBACTAM SODIUM AND PIPERACILLIN SODIUM 3.38 G: 375; 3 INJECTION, SOLUTION INTRAVENOUS at 13:23

## 2019-05-17 RX ADMIN — Medication: at 14:57

## 2019-05-17 RX ADMIN — TAZOBACTAM SODIUM AND PIPERACILLIN SODIUM 3.38 G: 375; 3 INJECTION, SOLUTION INTRAVENOUS at 00:44

## 2019-05-17 RX ADMIN — PANTOPRAZOLE SODIUM 40 MG: 40 INJECTION, POWDER, FOR SOLUTION INTRAVENOUS at 00:45

## 2019-05-17 ASSESSMENT — ACTIVITIES OF DAILY LIVING (ADL)
ADLS_ACUITY_SCORE: 14

## 2019-05-17 NOTE — PLAN OF CARE
POD 1 from exp lap for a perforated gastric ulcer   BS faint, midline incision dressing CDI   Low grade temp 99.0, recheck 98.5 other vital signs stable  Currently on 2L NC, denies any pain/nausea  PCA Dilaudid 0.2/10/1.8 total used 0.2  Capnography in place  PIV /hr, continues IV Zosyn and IV Protonix   Up with assist x 1, not out of bed this shift  Disla with adequate output, JOANNA with 30mL output, minimal NG output  BG checks q 4 hrs 103, 114 no coverage needed     Critical Hgb 6.6 and sepsis triggered, passed along to oncoming RN

## 2019-05-17 NOTE — PROGRESS NOTES
Patient was seen and examined by me this morning.  He has some abdominal pain but is getting better with the medication.  No fever chills.  No nausea or vomiting.  No dizziness.  Did not pass gas or had bowel movement.  Hemoglobin this morning was 6.6.  Consent was obtained from patient through  phone with staff assistance.  Risk and benefits of blood transfusion was discussed with patient.  Patient is okay with blood transfusion.  1 unit of blood already ordered and plan is to give him blood, monitor hemoglobin.  Surgical team to see patient for further recommendation.  Please review my detailed progress note at later time.

## 2019-05-17 NOTE — PROGRESS NOTES
"[facility]   General Surgery Progress Note         Assessment and Plan:   Assessment:   Acute upper GI Bleed due to perforated gastric ulcer  POD#1 s/p exploratory laparotomy, oversew of gastric ulcer and omental patch.      Plan:   -Continue NPO/NGT. Ice chips/swabs okay  -pain control:  Dilaudid PCA - discontinue tomorrow and switch to PRNs  -abx:  IV Zosyn   -GI prophylaxis:  Protonix  -VTE prophylaxis:  PCDs. Would start lovenox once Hb stabilizes.  -driscoll: remove tomorrow  -increase activity - ambulate TID  -appreciate hospitalist assistance  -Planning for UGI on Monday         Interval History:   Resting in bed, comfortable. Minimal pain in his incision. +NPO, +NGT, +Driscoll. Was able to walk last night.          Physical Exam:   Blood pressure 113/64, pulse 81, temperature 97.8  F (36.6  C), temperature source Oral, resp. rate 20, height 1.58 m (5' 2.21\"), weight 77.7 kg (171 lb 3.2 oz), SpO2 95 %.    I/O last 3 completed shifts:  In: 2455 [I.V.:2455]  Out: 1855 [Urine:1650; Emesis/NG output:95; Drains:110]    Abdomen:   soft, +mildly distended, mild tenderness to palpation   Inc(s) -c/d/i with staples, no drainage, no erythema, +mild tenderness to palpation    Eugene - serosanguinous          Data:     Recent Labs   Lab 05/17/19  0628 05/16/19  0932 05/15/19  2234   HGB 6.6* 7.5* 7.0*     Recent Labs   Lab 05/17/19  0628 05/15/19  2234   WBC 17.3* 8.9       Raad Marcano PA-C     Pt seen and examined with  present. Pain minimal. Really not using PCA much - states he has pushed the button x 3 total today. Discussed discontinuing and just doing PRNs - he would like to keep it for overnight.  He has not walked today due to being hooked up to NG, having driscoll, PCA and capno all hooked up. Discussed with him despite all these things he can still walk and NG can be clamped for walks. But by tomorrow he should be off PCA, capno and driscoll will come out which will make ambulation " easier.  Discussed NG to stay in until UGI done Monday  Disla to come out tomorrow  Would start lovenox tomorrow if Hb stable after 1u prbc today    Mindy Vora MD

## 2019-05-17 NOTE — PLAN OF CARE
Orientation: A&Ox4  VSS  LS: clear  Cardiac: WNL  GI: faint, passing gas  : driscoll in place  Skin: midline incision- CDI  Activity: Ax1  Pain: dilaudid PCA  Lines/IV: JOANNA in place, 2 PIV  Blood Sugars- 98 & 72

## 2019-05-17 NOTE — PROGRESS NOTES
[facility]  Hospitalist Progress Note  Kit Ayala MD 05/17/2019    Reason for Stay (Diagnosis):     Perforated viscus.         Assessment and Plan:      Summary of Stay: Manny Manuel is a 59 year old male  with a history of HTN, DM Type 2 who presented to the ED on 5/15/19 with abdominal and chest pain and found to have a perforated gastric ulcer.  General Surgery admitted the patient and he underwent an exploratory laparotomy with oversew and patching of a perforated gastric ulcer.  Internal Medicine service was asked to see for management of chronic medical problems      Problem List:     1. Acute upper GI bleed and perforated gastric ulcer status post exploratory laparotomy, oversewing patching postoperative day #2  Assessment: Patient is hemodynamically stable.  He remained n.p.o.  No chest pain or shortness of breath.  No significant pain this morning.  Hemoglobin is low.  I spoke with surgical team  Plan: Continue postoperative care per primary team.  Patient needs intravenous fluid and consideration for alternative feeding as he is n.p.o. is a next 48 hrs.  2. Severe anemia: .  Assessment:*Acute blood loss anemia likely from GI bleed.  Hemoglobin 6.6 this morning.  Patient has no significant symptoms.  I suspect this is acute on chronic exacerbated by acute blood loss anemia secondary to surgical intervention from laparotomy  Plan: A unit of blood was ordered.  Consent signed at bedside after risks and benefits of blood transfusion was discussed this patient over the phone with .  3. Hypertension:   Assessment: Not well controlled since p.o. medications was held  Plan: Will start beta-blocker intravenously    4.  Type 2 diabetes: Excellent control with hemoglobin A1c of 5.2.  Patient was on metformin PTA  Assessment: Blood sugar is running low since patient is n.p.o.  Plan: Administer dextrose containing IV fluid, continue to monitor intake and output as well as blood  "sugars.  Continue to hold metformin.  Sliding scale insulin available for controlling severely elevated blood sugar.    Indwelling devices: Cornelio-Koch drain in place, Disla catheter in place    FEN : Patient is n.p.o.  Electrolytes okay    DVT Prophylaxis: Pneumatic Compression Devices    Code Status: Full Code    Discharge Dispo: Home    Estimated Disch Date / # of Days until Disch: Once bowel function returns and okay with surgical team        Interval History (Subjective):      Patient was seen and examined by me this morning.  He is feeling well without complaint of pain.  No fever chills.  No chest pain or shortness of breath.  He has no dizziness or lightheadedness.  Hemoglobin running low but no evidence of acute bleeding.  Consent was signed.                  Physical Exam:      Last Vital Signs:  /74   Pulse 81   Temp 98.6  F (37  C) (Oral)   Resp 24   Ht 1.58 m (5' 2.21\")   Wt 77.7 kg (171 lb 3.2 oz)   SpO2 95%   BMI 31.11 kg/m      I/O last 3 completed shifts:  In: 2455 [I.V.:2455]  Out: 1855 [Urine:1650; Emesis/NG output:95; Drains:110]    Constitutional: Awake, alert, cooperative, no apparent distress     Respiratory: Clear to auscultation bilaterally, no crackles or wheezing   Cardiovascular: Regular rate and rhythm, normal S1 and S2, and no murmur noted   Abdomen:  Bowel sounds are hypoactive.  Soft otherwise with JOANNA drain in epigastric location.   Skin: No rashes, no cyanosis, dry to touch   Neuro: Alert and oriented x3, no weakness, numbness, memory loss   Extremities: No edema, normal range of motion   Other(s):        All other systems: Negative          Medications:      All current medications were reviewed with changes reflected in problem list.         Data:      All new lab and imaging data was reviewed.      Data reviewed today: I reviewed all new labs and imaging results over the last 24 hours. I personally reviewed no images or EKG's today      Recent Labs   Lab " 05/17/19  0628 05/16/19  0932 05/15/19  2234   WBC 17.3*  --  8.9   HGB 6.6* 7.5* 7.0*   HCT 21.6*  --  22.6*   MCV 94  --  92     --  416     No results for input(s): CULT in the last 168 hours.  Recent Labs   Lab 05/17/19  0628 05/16/19  0932 05/15/19  2234   * 141 137   POTASSIUM 4.4 4.6 4.6   CHLORIDE 117* 112* 107   CO2 22 23 23   ANIONGAP 6 6 7   GLC 98 143* 144*   BUN 24 24 35*   CR 1.52* 1.32* 1.54*   GFRESTIMATED 49* 58* 48*   GFRESTBLACK 56* 67 55*   LU 7.6* 7.6* 8.4*   PROTTOTAL  --   --  7.0   ALBUMIN  --   --  3.2*   BILITOTAL  --   --  0.4   ALKPHOS  --   --  86   AST  --   --  17   ALT  --   --  20       Recent Labs   Lab 05/17/19  1212 05/17/19  0821 05/17/19  0628 05/17/19  0337 05/17/19  0001 05/16/19 2042  05/16/19  0932  05/15/19  2234   GLC  --   --  98  --   --   --   --  143*  --  144*   BGM 72 98  --  114* 103* 107*   < >  --    < >  --     < > = values in this interval not displayed.           No results for input(s): INR in the last 168 hours.      Recent Labs   Lab 05/15/19  2234   TROPI <0.015       Recent Results (from the past 48 hour(s))   US Abdomen Limited    Narrative    US ABDOMEN LIMITED  5/15/2019 11:36 PM      HISTORY: Right upper quadrant pain, gallstones, jaundice.    COMPARISON: None.    FINDINGS: The liver is normal in size and texture without focal mass.  There is no intra or extrahepatic biliary dilatation. The common  hepatic duct measures 0.6 cm. The gallbladder is normal in appearance  without gallstones. The pancreas is completely obscured by bowel gas.  The right kidney measures 9.6 cm. There is a 0.8 cm cyst in the upper  pole of the kidney. The proximal abdominal aorta and IVC appear  normal.      Impression    IMPRESSION: Normal right upper quadrant. No gallstone or biliary  dilatation.    SARI YA MD   CT Aortic Survey w Contrast   Result Value    Radiologist flags Unexplained pneumoperitoneum (AA)    Narrative    CT AORTIC SURVEY W CONTRAST   5/16/2019 12:27 AM      HISTORY: Abdominal and chest pain. Low hemoglobin.    TECHNIQUE: CT chest, abdomen and pelvis with intravenous contrast.  Radiation dose for this scan was reduced using automated exposure  control, adjustment of the mA and/or kV according to patient size, or  iterative reconstruction technique. 80 mL Isovue-370.      COMPARISON: None.    FINDINGS:  Chest: There is no thoracic aortic aneurysm or dissection. No central  pulmonary embolus. There are coronary artery atherosclerotic  calcifications. The heart is at the upper limits of normal in size.  There is no mediastinal, hilar or axillary lymph node enlargement.  There is dependent atelectasis bilaterally. 0.7 cm nodule along the  right major fissure. Scarring at the lung bases. No pneumothorax or  pleural effusion.    Abdomen: There is atherosclerotic calcification of the abdominal aorta  and its branches. No aneurysm or dissection. The liver, spleen,  gallbladder, pancreas, adrenal glands and left kidney are normal in  appearance. Small probable cortical cyst in the upper pole of the  right kidney.    There is free intraperitoneal gas. There is evidence of gastric ulcer  and perforation in the region of the pylorus. The stomach is distended  with air and food debris. There is no abdominal or pelvic lymph node  enlargement.    Pelvis: There are colonic diverticula without acute diverticulitis.  Small and large bowel otherwise appear normal. There is a small amount  of ascites. Degenerative disease in the spine.      Impression    IMPRESSION:  1. Free intraperitoneal gas which appears to be due to a perforated  ulcer in the distal stomach.  2. No aortic aneurysm or dissection.  3. Small amount of ascites.    [Critical Result: Unexplained pneumoperitoneum]    Finding was identified on 5/16/2019 12:35 AM.     Dr. Sánchez was contacted by me on 5/16/2019 12:39 AM and verbalized  understanding of the critical result.    SARI YA MD   XR  Nasal Gastric Tube Placement    Narrative    FEEDING TUBE PLACEMENT   5/16/2019 1:17 PM    HISTORY: Nutritional needs.    COMPARISON: None    FINDINGS: 1.45 minutes of fluoroscopy were utilized for placement of a  feeding tube.  At the final position, the 14 [place] nasogastric  suction tube tip was placed within the stomach.  20 mL of contrast was  injected to confirm placement.  The tube was fastened to the patient's  nose using tape. Estimated blood loss during the procedure was less  than 5 mL. No specimens collected. There were no complications of the  procedure.    Medications used: 5 mL of 2% lidocaine jelly  Images Obtained: 1      Impression    IMPRESSION: Successful NG tube placement.    TITA GARSIA PA-C           Disclaimer: This note consists of symbols derived from keyboarding, dictation and/or voice recognition software. As a result, there may be errors in the script that have gone undetected. Please consider this when interpreting information found in this chart

## 2019-05-17 NOTE — PROVIDER NOTIFICATION
Paged MD regarding lab calling about hmg: pt hmg recheck was 9.9, previous was 6.6. Lab was concerned in the jump, do you want a recheck? pt has labs for am.   MD ordered recheck of hmg  Recheck was 9.6.

## 2019-05-17 NOTE — PLAN OF CARE
Patient remains hospitalized following perforated gastric ulcer, currently POD #0. Pain controlled with PCA pump. Bowel sounds faint. Passing gas. LBM unknown, pt does not remember. Nausea denies. Currently on NPO diet. JOANNA drain had 50mL out. NG tube patent, 50mL out. Incision CDI. Voiding via Disla catheter. Ambulating with SBA.     No

## 2019-05-18 LAB
ANION GAP SERPL CALCULATED.3IONS-SCNC: 7 MMOL/L (ref 3–14)
BUN SERPL-MCNC: 16 MG/DL (ref 7–30)
CALCIUM SERPL-MCNC: 8 MG/DL (ref 8.5–10.1)
CHLORIDE SERPL-SCNC: 113 MMOL/L (ref 94–109)
CO2 SERPL-SCNC: 22 MMOL/L (ref 20–32)
CREAT SERPL-MCNC: 1.31 MG/DL (ref 0.66–1.25)
ERYTHROCYTE [DISTWIDTH] IN BLOOD BY AUTOMATED COUNT: 14.3 % (ref 10–15)
GFR SERPL CREATININE-BSD FRML MDRD: 58 ML/MIN/{1.73_M2}
GLUCOSE BLDC GLUCOMTR-MCNC: 102 MG/DL (ref 70–99)
GLUCOSE BLDC GLUCOMTR-MCNC: 117 MG/DL (ref 70–99)
GLUCOSE BLDC GLUCOMTR-MCNC: 82 MG/DL (ref 70–99)
GLUCOSE BLDC GLUCOMTR-MCNC: 87 MG/DL (ref 70–99)
GLUCOSE BLDC GLUCOMTR-MCNC: 95 MG/DL (ref 70–99)
GLUCOSE SERPL-MCNC: 115 MG/DL (ref 70–99)
HCT VFR BLD AUTO: 26.9 % (ref 40–53)
HGB BLD-MCNC: 8.4 G/DL (ref 13.3–17.7)
MAGNESIUM SERPL-MCNC: 2.2 MG/DL (ref 1.6–2.3)
MCH RBC QN AUTO: 28.9 PG (ref 26.5–33)
MCHC RBC AUTO-ENTMCNC: 31.2 G/DL (ref 31.5–36.5)
MCV RBC AUTO: 92 FL (ref 78–100)
PHOSPHATE SERPL-MCNC: 2.9 MG/DL (ref 2.5–4.5)
PLATELET # BLD AUTO: 370 10E9/L (ref 150–450)
POTASSIUM SERPL-SCNC: 4.6 MMOL/L (ref 3.4–5.3)
RBC # BLD AUTO: 2.91 10E12/L (ref 4.4–5.9)
SODIUM SERPL-SCNC: 142 MMOL/L (ref 133–144)
WBC # BLD AUTO: 15.9 10E9/L (ref 4–11)

## 2019-05-18 PROCEDURE — C9113 INJ PANTOPRAZOLE SODIUM, VIA: HCPCS | Performed by: SURGERY

## 2019-05-18 PROCEDURE — 25800030 ZZH RX IP 258 OP 636: Performed by: INTERNAL MEDICINE

## 2019-05-18 PROCEDURE — 12000000 ZZH R&B MED SURG/OB

## 2019-05-18 PROCEDURE — 85027 COMPLETE CBC AUTOMATED: CPT | Performed by: SURGERY

## 2019-05-18 PROCEDURE — 36415 COLL VENOUS BLD VENIPUNCTURE: CPT | Performed by: SURGERY

## 2019-05-18 PROCEDURE — 84100 ASSAY OF PHOSPHORUS: CPT | Performed by: SURGERY

## 2019-05-18 PROCEDURE — 25000128 H RX IP 250 OP 636: Performed by: SURGERY

## 2019-05-18 PROCEDURE — 80048 BASIC METABOLIC PNL TOTAL CA: CPT | Performed by: SURGERY

## 2019-05-18 PROCEDURE — 99232 SBSQ HOSP IP/OBS MODERATE 35: CPT | Performed by: INTERNAL MEDICINE

## 2019-05-18 PROCEDURE — 00000146 ZZHCL STATISTIC GLUCOSE BY METER IP

## 2019-05-18 PROCEDURE — 83735 ASSAY OF MAGNESIUM: CPT | Performed by: SURGERY

## 2019-05-18 RX ADMIN — TAZOBACTAM SODIUM AND PIPERACILLIN SODIUM 3.38 G: 375; 3 INJECTION, SOLUTION INTRAVENOUS at 06:32

## 2019-05-18 RX ADMIN — TAZOBACTAM SODIUM AND PIPERACILLIN SODIUM 3.38 G: 375; 3 INJECTION, SOLUTION INTRAVENOUS at 00:53

## 2019-05-18 RX ADMIN — POTASSIUM CHLORIDE, DEXTROSE MONOHYDRATE AND SODIUM CHLORIDE: 150; 5; 450 INJECTION, SOLUTION INTRAVENOUS at 11:14

## 2019-05-18 RX ADMIN — PANTOPRAZOLE SODIUM 40 MG: 40 INJECTION, POWDER, FOR SOLUTION INTRAVENOUS at 00:47

## 2019-05-18 RX ADMIN — Medication: at 22:12

## 2019-05-18 RX ADMIN — TAZOBACTAM SODIUM AND PIPERACILLIN SODIUM 3.38 G: 375; 3 INJECTION, SOLUTION INTRAVENOUS at 14:47

## 2019-05-18 RX ADMIN — TAZOBACTAM SODIUM AND PIPERACILLIN SODIUM 3.38 G: 375; 3 INJECTION, SOLUTION INTRAVENOUS at 19:47

## 2019-05-18 RX ADMIN — PANTOPRAZOLE SODIUM 40 MG: 40 INJECTION, POWDER, FOR SOLUTION INTRAVENOUS at 14:25

## 2019-05-18 ASSESSMENT — ACTIVITIES OF DAILY LIVING (ADL)
ADLS_ACUITY_SCORE: 14

## 2019-05-18 NOTE — PROGRESS NOTES
[facility]  Hospitalist Progress Note  Kit Ayala MD 05/18/2019    Reason for Stay (Diagnosis):     Perforated viscus.         Assessment and Plan:      Summary of Stay: Manny Manuel is a 59 year old male  with a history of HTN, DM Type 2 who presented to the ED on 5/15/19 with abdominal and chest pain and found to have a perforated gastric ulcer.  General Surgery admitted the patient and he underwent an exploratory laparotomy with oversew and patching of a perforated gastric ulcer.  Internal Medicine service was asked to see for management of chronic medical problems      Problem List:     1. Acute upper GI bleed and perforated gastric ulcer status post exploratory laparotomy, oversewing patching postoperative day #3  Assessment: Patient is hemodynamically stable.  He remained n.p.o.  No chest pain or shortness of breath.  No significant pain this morning.  Hemoglobin is low.  I spoke with surgical team  Plan: Continue postoperative care per primary team.  Patient needs intravenous fluid and consideration for alternative feeding as he is n.p.o. is a next 24 hours if enteral feeding is not possible.  2. Severe anemia: .  Assessment:Acute blood loss anemia likely from GI bleed.  Had a unit of blood for hgb of 6.6 and repeat  8.4   Plan: monitor hgb.    3. Hypertension:   Assessment: Not well controlled since p.o. medications was held  Plan: continue iv med     4. Type 2 diabetes: Excellent control with hemoglobin A1c of 5.2.  Patient was on metformin PTA  Assessment: Blood sugar is running low since patient is n.p.o.  Plan: Continue  dextrose containing IV fluid, continue to monitor intake and output as well as blood sugars.  Continue to hold metformin.  Sliding scale insulin available for controlling severely elevated blood sugar.    Indwelling devices: Cornelio-Koch drain in place, Disla catheter in place    FEN : Patient is n.p.o.  Electrolytes okay    DVT Prophylaxis: Pneumatic  "Compression Devices    Code Status: Full Code    Discharge Dispo: Home    Estimated Disch Date / # of Days until Disch: per surgery service. Needs several days in the hospital         Interval History (Subjective):      Patient is seen and examined by me today and medical record reviewed.Overnight events noted and care discussed with nursing staff.  Feels better.No  significant  Pain.           Physical Exam:      Last Vital Signs:  /79 (BP Location: Left arm)   Pulse 81   Temp 97.9  F (36.6  C) (Oral)   Resp 20   Ht 1.58 m (5' 2.21\")   Wt 77.7 kg (171 lb 3.2 oz)   SpO2 98%   BMI 31.11 kg/m      I/O last 3 completed shifts:  In: 1642 [P.O.:30; I.V.:1612]  Out: 1965 [Urine:1725; Emesis/NG output:180; Drains:60]    Constitutional: Awake, alert, cooperative, no apparent distress     Respiratory: Clear to auscultation bilaterally, no crackles or wheezing   Cardiovascular: Regular rate and rhythm, normal S1 and S2, and no murmur noted   Abdomen:  Bowel sounds are hypoactive.  Soft otherwise with JOANNA drain in epigastric location.   Skin: No rashes, no cyanosis, dry to touch   Neuro: Alert and oriented x3, no weakness, numbness, memory loss   Extremities: No edema, normal range of motion   Other(s):        All other systems: Negative          Medications:      All current medications were reviewed with changes reflected in problem list.         Data:      All new lab and imaging data was reviewed.      Data reviewed today: I reviewed all new labs and imaging results over the last 24 hours. I personally reviewed no images or EKG's today      Recent Labs   Lab 05/18/19  0755 05/17/19  1821 05/17/19  1700 05/17/19  0628  05/15/19  2234   WBC 15.9*  --   --  17.3*  --  8.9   HGB 8.4* 9.6* 9.9* 6.6*   < > 7.0*   HCT 26.9*  --   --  21.6*  --  22.6*   MCV 92  --   --  94  --  92     --   --  340  --  416    < > = values in this interval not displayed.     No results for input(s): CULT in the last 168 " hours.  Recent Labs   Lab 05/18/19  0755 05/17/19  0628 05/16/19  0932 05/15/19  2234    145* 141 137   POTASSIUM 4.6 4.4 4.6 4.6   CHLORIDE 113* 117* 112* 107   CO2 22 22 23 23   ANIONGAP 7 6 6 7   * 98 143* 144*   BUN 16 24 24 35*   CR 1.31* 1.52* 1.32* 1.54*   GFRESTIMATED 58* 49* 58* 48*   GFRESTBLACK 67 56* 67 55*   LU 8.0* 7.6* 7.6* 8.4*   MAG 2.2  --   --   --    PHOS 2.9  --   --   --    PROTTOTAL  --   --   --  7.0   ALBUMIN  --   --   --  3.2*   BILITOTAL  --   --   --  0.4   ALKPHOS  --   --   --  86   AST  --   --   --  17   ALT  --   --   --  20       Recent Labs   Lab 05/18/19  1223 05/18/19  0755 05/18/19  0342 05/17/19  2358 05/17/19  2129 05/17/19 2020 05/17/19  0628  05/16/19  0932  05/15/19  2234   GLC  --  115*  --   --   --   --   --  98  --  143*  --  144*   BGM 95  --  117* 102* 94 94   < >  --    < >  --    < >  --     < > = values in this interval not displayed.           No results for input(s): INR in the last 168 hours.      Recent Labs   Lab 05/15/19  2234   TROPI <0.015       Recent Results (from the past 48 hour(s))   US Abdomen Limited    Narrative    US ABDOMEN LIMITED  5/15/2019 11:36 PM      HISTORY: Right upper quadrant pain, gallstones, jaundice.    COMPARISON: None.    FINDINGS: The liver is normal in size and texture without focal mass.  There is no intra or extrahepatic biliary dilatation. The common  hepatic duct measures 0.6 cm. The gallbladder is normal in appearance  without gallstones. The pancreas is completely obscured by bowel gas.  The right kidney measures 9.6 cm. There is a 0.8 cm cyst in the upper  pole of the kidney. The proximal abdominal aorta and IVC appear  normal.      Impression    IMPRESSION: Normal right upper quadrant. No gallstone or biliary  dilatation.    SARI YA MD   CT Aortic Survey w Contrast   Result Value    Radiologist flags Unexplained pneumoperitoneum (AA)    Narrative    CT AORTIC SURVEY W CONTRAST  5/16/2019 12:27 AM       HISTORY: Abdominal and chest pain. Low hemoglobin.    TECHNIQUE: CT chest, abdomen and pelvis with intravenous contrast.  Radiation dose for this scan was reduced using automated exposure  control, adjustment of the mA and/or kV according to patient size, or  iterative reconstruction technique. 80 mL Isovue-370.      COMPARISON: None.    FINDINGS:  Chest: There is no thoracic aortic aneurysm or dissection. No central  pulmonary embolus. There are coronary artery atherosclerotic  calcifications. The heart is at the upper limits of normal in size.  There is no mediastinal, hilar or axillary lymph node enlargement.  There is dependent atelectasis bilaterally. 0.7 cm nodule along the  right major fissure. Scarring at the lung bases. No pneumothorax or  pleural effusion.    Abdomen: There is atherosclerotic calcification of the abdominal aorta  and its branches. No aneurysm or dissection. The liver, spleen,  gallbladder, pancreas, adrenal glands and left kidney are normal in  appearance. Small probable cortical cyst in the upper pole of the  right kidney.    There is free intraperitoneal gas. There is evidence of gastric ulcer  and perforation in the region of the pylorus. The stomach is distended  with air and food debris. There is no abdominal or pelvic lymph node  enlargement.    Pelvis: There are colonic diverticula without acute diverticulitis.  Small and large bowel otherwise appear normal. There is a small amount  of ascites. Degenerative disease in the spine.      Impression    IMPRESSION:  1. Free intraperitoneal gas which appears to be due to a perforated  ulcer in the distal stomach.  2. No aortic aneurysm or dissection.  3. Small amount of ascites.    [Critical Result: Unexplained pneumoperitoneum]    Finding was identified on 5/16/2019 12:35 AM.     Dr. Sánchez was contacted by me on 5/16/2019 12:39 AM and verbalized  understanding of the critical result.    SARI YA MD   XR Nasal Gastric Tube  Placement    Narrative    FEEDING TUBE PLACEMENT   5/16/2019 1:17 PM    HISTORY: Nutritional needs.    COMPARISON: None    FINDINGS: 1.45 minutes of fluoroscopy were utilized for placement of a  feeding tube.  At the final position, the 14 [place] nasogastric  suction tube tip was placed within the stomach.  20 mL of contrast was  injected to confirm placement.  The tube was fastened to the patient's  nose using tape. Estimated blood loss during the procedure was less  than 5 mL. No specimens collected. There were no complications of the  procedure.    Medications used: 5 mL of 2% lidocaine jelly  Images Obtained: 1      Impression    IMPRESSION: Successful NG tube placement.    TITA GARSIA PA-C           Disclaimer: This note consists of symbols derived from keyboarding, dictation and/or voice recognition software. As a result, there may be errors in the script that have gone undetected. Please consider this when interpreting information found in this chart

## 2019-05-18 NOTE — PLAN OF CARE
Ambulatory Status:  Pt up A1  VS:  vss  Pain:  PCA available  Resp: LS clear  GI:  denies nausea.  NPO.  BS faint. NG in place. Last BM unknown.  :  driscoll removed this morning, due to void  Skin:  dressing CDI, JOANNA in place  Tx:  zosyn  Labs:  bg 102, 117  Consults:  surgery  Disposition:  tbd

## 2019-05-18 NOTE — CONSULTS
"CLINICAL NUTRITION SERVICES  -  ASSESSMENT NOTE      MALNUTRITION:  % Weight Loss:  Weight loss does not meet criteria for malnutrition --> based on documentation available, patient himself reports larger wt loss  % Intake:  </= 50% for >/= 5 days (severe malnutrition) --> per patient report  Subcutaneous Fat Loss:  None observed  Muscle Loss:  Temporal region mild depletion and Acromion bone region mild depletion --> masked by adiposity to some degree, also likely combination age-related  Fluid Retention:  None noted    Malnutrition Diagnosis: Non-Severe malnutrition  In Context of:  Acute illness or injury  Chronic illness or disease        REASON FOR ASSESSMENT  Manny Manuel is a 59 year old male seen by Registered Dietitian for Provider Order - \"nutritional status assessment and recommendation\".      NUTRITION HISTORY  - Information obtained from chart and patient using interpretor phone.  He speaks Tajik.  - Patient with a h/o DMII (oral agent PTA), ?previous etoh abuse.  Admitted 2/2 perforated gastric ulcer.    - Abdominal pain x 2 weeks PTA.  - Patient reports because of pain, N/V, he could not eat during this time period.  He feels he was consistently eating less than usual during this time, references <50% of usual.  Describes having maybe 1 tortilla with cheese a day.  - When feeling well he follows a regular diet with meals TID.  - NKFA.      CURRENT NUTRITION ORDERS  Diet Order:     NPO    Current Intake/Tolerance:  NPO since admission - x2 days.      NUTRITION FOCUSED PHYSICAL ASSESSMENT (NFPA)  Completed:  Yes Full assessment         Observed:    Muscle wasting (refer to documentation in Malnutrition section)    Obtained from Chart/Interdisciplinary Team:  Exploratory lap, oversew and patching of perforated gastric ulcer 5/16/2019  Per Surgery notes, UGI planned for Monday  Minimal NGT output (200 ml yesterday, 50 ml thus far today)  No BM since admit    ANTHROPOMETRICS  Height: 5' 2.205\"  Weight: " 77.7 kg (171#)  Body mass index is 31.11 kg/m .  Weight Status:  Obesity Grade I BMI 30-34.9  IBW: 53.6 kg (118#)  % IBW: 145%  Weight History:  Wt Readings from Last 10 Encounters:   05/16/19 77.7 kg (171 lb 3.2 oz)   - Wt of 173# from 5/03/2019 per care everywhere.  - Wt of 179# from 12/28/2018 per care everywhere - not nutritionally significant with 4% loss over 4 month period.  - Overall stable since early May.  Patient himself believes he has lost a substantial amount of weight over the past 2 weeks.  ?References 20#.  This is not visualized per documentation in chart.  He does report his UBW as 180#.    LABS  Labs reviewed    MEDICATIONS  Medications reviewed      ASSESSED NUTRITION NEEDS PER APPROVED PRACTICE GUIDELINES:    Dosing Weight 60 kg - adjusted weight  Estimated Energy Needs: 6748-4306 kcals (25-30 Kcal/Kg)  Justification: maintenance  Estimated Protein Needs: 72-90 grams protein (1.2-1.5 g pro/Kg)  Justification: preservation of lean body mass and also repletion needs  Estimated Fluid Needs: per MD    MALNUTRITION:  % Weight Loss:  Weight loss does not meet criteria for malnutrition --> based on documentation available, patient himself reports larger wt loss  % Intake:  </= 50% for >/= 5 days (severe malnutrition) --> per patient report  Subcutaneous Fat Loss:  None observed  Muscle Loss:  Temporal region mild depletion and Acromion bone region mild depletion --> masked by adiposity to some degree, also likely combination age-related  Fluid Retention:  None noted    Malnutrition Diagnosis: Non-Severe malnutrition  In Context of:  Acute illness or injury  Chronic illness or disease    NUTRITION DIAGNOSIS:  Inadequate oral intake related to altered GI function post-op as evidenced by NPO x 2 days since admission, patient reporting prolonged period of not meeting even 50% usual intakes for 2 weeks PTA, coding of malnutrition with e/o muscle loss.      NUTRITION INTERVENTIONS  Recommendations /  Nutrition Prescription  Diet per MD.  Deferring more aggressive nutrition-related interventions to Surgery team.  Noted UGI Monday.      Implementation  Nutrition education: Provided education on diet per MD.      Nutrition Goals  Diet advancement vs need for consideration of more aggressive nutrition-related interventions w/in 48 hrs.       MONITORING AND EVALUATION:  Progress towards goals will be monitored and evaluated per protocol and Practice Guidelines        Britt Schaeffer RD, LD  Clinical Dietitian  3rd floor/ICU: [phone]  All other floors: [phone]  Weekend/holiday: [phone]

## 2019-05-18 NOTE — PROGRESS NOTES
"[facility]   General Surgery Progress Note           Assessment and Plan:   Assessment:   POD#2 s/p Procedure(s):  Exploratory laparotomy, oversew and patching of perforated gastric ulcer.        Plan:   -UGI Monday, ordered         Interval History:   Disla out         Physical Exam:   Blood pressure 140/79, pulse 81, temperature 97.9  F (36.6  C), temperature source Oral, resp. rate 20, height 1.58 m (5' 2.21\"), weight 77.7 kg (171 lb 3.2 oz), SpO2 98 %.    I/O last 3 completed shifts:  In: 1967 [P.O.:30; I.V.:1937]  Out: 2435 [Urine:2125; Emesis/NG output:250; Drains:60]    Abdomen:   soft, non-distended, tenderness noted at incision    Inc(s) - clean, dry, intact      [place]ugene - serous          Data:     Recent Labs   Lab 05/18/19  0755 05/17/19  0628 05/16/19  0932    145* 141   POTASSIUM 4.6 4.4 4.6   CHLORIDE 113* 117* 112*   CO2 22 22 23   ANIONGAP 7 6 6   * 98 143*   BUN 16 24 24   CR 1.31* 1.52* 1.32*   GFRESTIMATED 58* 49* 58*   GFRESTBLACK 67 56* 67   LU 8.0* 7.6* 7.6*     Recent Labs   Lab 05/18/19  0755 05/17/19  1821 05/17/19  1700 05/17/19  0628  05/15/19  2234   WBC 15.9*  --   --  17.3*  --  8.9   HGB 8.4* 9.6* 9.9* 6.6*   < > 7.0*   HCT 26.9*  --   --  21.6*  --  22.6*   MCV 92  --   --  94  --  92     --   --  340  --  416    < > = values in this interval not displayed.       Michael Montez MD     "

## 2019-05-18 NOTE — PLAN OF CARE
Pt is a/o, up with A1. Temperature of 100.1 and recheck was 99.4. On 2L nasal cannula at 94%. Pain in abdomen at surgical site. On PCA for pain, used 0.8 mg dilaudid. Blood sugars were 95 and 94. On D51/2NS fluids. On IV zosyn.  NG output was 100. Surgery PA said NG will be assessed to be removed on Monday.  at bedside. Spouse present and stayed the night. Will continue to monitor

## 2019-05-18 NOTE — PLAN OF CARE
A&O. Icelandic speaking,  for assessment. Lung sounds: clear. Diet: NPO with ice chips. Denies flatus. Disla removed on NOC, voided 400 ml this shift. NG in place with 30 mL of green/yellow output. PJ drain with 30 ml output. B and 95. PIV: IV zosyn. Pain: controlled with PCA, used one dose. Cont POC.

## 2019-05-19 LAB
ANION GAP SERPL CALCULATED.3IONS-SCNC: 4 MMOL/L (ref 3–14)
BUN SERPL-MCNC: 11 MG/DL (ref 7–30)
CALCIUM SERPL-MCNC: 8.5 MG/DL (ref 8.5–10.1)
CHLORIDE SERPL-SCNC: 110 MMOL/L (ref 94–109)
CO2 SERPL-SCNC: 24 MMOL/L (ref 20–32)
CREAT SERPL-MCNC: 1.16 MG/DL (ref 0.66–1.25)
ERYTHROCYTE [DISTWIDTH] IN BLOOD BY AUTOMATED COUNT: 13.8 % (ref 10–15)
GFR SERPL CREATININE-BSD FRML MDRD: 67 ML/MIN/{1.73_M2}
GLUCOSE BLDC GLUCOMTR-MCNC: 103 MG/DL (ref 70–99)
GLUCOSE BLDC GLUCOMTR-MCNC: 109 MG/DL (ref 70–99)
GLUCOSE BLDC GLUCOMTR-MCNC: 75 MG/DL (ref 70–99)
GLUCOSE BLDC GLUCOMTR-MCNC: 76 MG/DL (ref 70–99)
GLUCOSE BLDC GLUCOMTR-MCNC: 87 MG/DL (ref 70–99)
GLUCOSE SERPL-MCNC: 111 MG/DL (ref 70–99)
HCT VFR BLD AUTO: 29.7 % (ref 40–53)
HGB BLD-MCNC: 9.2 G/DL (ref 13.3–17.7)
MAGNESIUM SERPL-MCNC: 1.9 MG/DL (ref 1.6–2.3)
MCH RBC QN AUTO: 28.8 PG (ref 26.5–33)
MCHC RBC AUTO-ENTMCNC: 31 G/DL (ref 31.5–36.5)
MCV RBC AUTO: 93 FL (ref 78–100)
PLATELET # BLD AUTO: 424 10E9/L (ref 150–450)
POTASSIUM SERPL-SCNC: 5.1 MMOL/L (ref 3.4–5.3)
RBC # BLD AUTO: 3.2 10E12/L (ref 4.4–5.9)
SODIUM SERPL-SCNC: 139 MMOL/L (ref 133–144)
WBC # BLD AUTO: 13.1 10E9/L (ref 4–11)

## 2019-05-19 PROCEDURE — 12000000 ZZH R&B MED SURG/OB

## 2019-05-19 PROCEDURE — 80048 BASIC METABOLIC PNL TOTAL CA: CPT | Performed by: SURGERY

## 2019-05-19 PROCEDURE — C9113 INJ PANTOPRAZOLE SODIUM, VIA: HCPCS | Performed by: SURGERY

## 2019-05-19 PROCEDURE — 25000128 H RX IP 250 OP 636: Performed by: SURGERY

## 2019-05-19 PROCEDURE — 99232 SBSQ HOSP IP/OBS MODERATE 35: CPT | Performed by: INTERNAL MEDICINE

## 2019-05-19 PROCEDURE — 00000146 ZZHCL STATISTIC GLUCOSE BY METER IP

## 2019-05-19 PROCEDURE — 25800030 ZZH RX IP 258 OP 636: Performed by: INTERNAL MEDICINE

## 2019-05-19 PROCEDURE — 83735 ASSAY OF MAGNESIUM: CPT | Performed by: SURGERY

## 2019-05-19 PROCEDURE — 99207 ZZC CDG-MDM COMPONENT: MEETS LOW - DOWN CODED: CPT | Performed by: INTERNAL MEDICINE

## 2019-05-19 PROCEDURE — 25000128 H RX IP 250 OP 636: Performed by: INTERNAL MEDICINE

## 2019-05-19 PROCEDURE — 36415 COLL VENOUS BLD VENIPUNCTURE: CPT | Performed by: SURGERY

## 2019-05-19 PROCEDURE — 85027 COMPLETE CBC AUTOMATED: CPT | Performed by: SURGERY

## 2019-05-19 PROCEDURE — 25800025 ZZH RX 258: Performed by: INTERNAL MEDICINE

## 2019-05-19 RX ORDER — HYDRALAZINE HYDROCHLORIDE 20 MG/ML
10 INJECTION INTRAMUSCULAR; INTRAVENOUS EVERY 8 HOURS
Status: DISCONTINUED | OUTPATIENT
Start: 2019-05-19 | End: 2019-05-19

## 2019-05-19 RX ORDER — HYDRALAZINE HYDROCHLORIDE 20 MG/ML
20 INJECTION INTRAMUSCULAR; INTRAVENOUS EVERY 8 HOURS
Status: DISCONTINUED | OUTPATIENT
Start: 2019-05-19 | End: 2019-05-21

## 2019-05-19 RX ORDER — HYDRALAZINE HYDROCHLORIDE 20 MG/ML
10 INJECTION INTRAMUSCULAR; INTRAVENOUS ONCE
Status: COMPLETED | OUTPATIENT
Start: 2019-05-19 | End: 2019-05-19

## 2019-05-19 RX ADMIN — HYDRALAZINE HYDROCHLORIDE 20 MG: 20 INJECTION INTRAMUSCULAR; INTRAVENOUS at 17:06

## 2019-05-19 RX ADMIN — TAZOBACTAM SODIUM AND PIPERACILLIN SODIUM 3.38 G: 375; 3 INJECTION, SOLUTION INTRAVENOUS at 06:28

## 2019-05-19 RX ADMIN — POTASSIUM CHLORIDE, DEXTROSE MONOHYDRATE AND SODIUM CHLORIDE: 150; 5; 450 INJECTION, SOLUTION INTRAVENOUS at 10:09

## 2019-05-19 RX ADMIN — PANTOPRAZOLE SODIUM 40 MG: 40 INJECTION, POWDER, FOR SOLUTION INTRAVENOUS at 01:01

## 2019-05-19 RX ADMIN — HYDRALAZINE HYDROCHLORIDE 10 MG: 20 INJECTION INTRAMUSCULAR; INTRAVENOUS at 10:03

## 2019-05-19 RX ADMIN — PANTOPRAZOLE SODIUM 40 MG: 40 INJECTION, POWDER, FOR SOLUTION INTRAVENOUS at 13:40

## 2019-05-19 RX ADMIN — TAZOBACTAM SODIUM AND PIPERACILLIN SODIUM 3.38 G: 375; 3 INJECTION, SOLUTION INTRAVENOUS at 20:46

## 2019-05-19 RX ADMIN — Medication: at 22:05

## 2019-05-19 RX ADMIN — TAZOBACTAM SODIUM AND PIPERACILLIN SODIUM 3.38 G: 375; 3 INJECTION, SOLUTION INTRAVENOUS at 13:40

## 2019-05-19 RX ADMIN — DEXTROSE AND SODIUM CHLORIDE: 5; 450 INJECTION, SOLUTION INTRAVENOUS at 10:25

## 2019-05-19 RX ADMIN — TAZOBACTAM SODIUM AND PIPERACILLIN SODIUM 3.38 G: 375; 3 INJECTION, SOLUTION INTRAVENOUS at 01:01

## 2019-05-19 ASSESSMENT — ACTIVITIES OF DAILY LIVING (ADL)
ADLS_ACUITY_SCORE: 14

## 2019-05-19 NOTE — PROGRESS NOTES
[facility]  Hospitalist Progress Note  Breann Temple MD 05/19/2019    Reason for Stay (Diagnosis): abdominal pain 2/2 perforated gastric ulcer         Assessment and Plan:      Summary of Stay: Manny Manuel is a 59 year old male with a history of hypertension and type 2 diabetes admitted on 5/15/2019 with abdominal pain secondary to an acutely perforated gastric ulcer.  He underwent emergent exploratory laparotomy and oversewing and patching of the perforated gastric ulcer ulcer.    He tolerated the procedure without difficulty, he has been stable since that time.  Only issue now is that he remains hypertensive in the setting of ongoing n.p.o. status and inability to give normal blood pressure medications  Problem List:   1. Perforated gastric ulcer: Status post surgical repair, continues on twice daily PPI.  Postop NG/diet/pain control per primary service  2. Gastric ulcer: Does take high-dose naproxen at home so presumably that was the precipitating agent.  He should avoid all NSAIDs, I have added that to his allergy list  3. Peritonitis secondary to perforated viscus: Continues on Pipracillin tazobactam day 4.  4. Hypertension: PTA medications are amlodipine 5 mg p.o. daily and lisinopril 40 mg p.o. daily both are on hold in the setting of n.p.o. status with NG in place.  BPs are quite high.  Therefore added and scheduled hydralazine at 20 mg every 8 hours.  NG should be coming out tomorrow so we can likely resume his typical medications at that time.  Have avoided enalapril in the setting of somewhat higher potassium levels likely in part due to IV fluids.  IV fluids switched and potassium is been removed reassess in the morning if NG remains in place and potassium level is trending down could consider addition of IV enalapril at that time  5. Type 2 diabetes: Only medication is metformin  mg p.o. Daily.  Currently only on insulin sliding scale without needs.  DVT Prophylaxis: Pneumatic  "Compression Devices  Code Status: Full Code  Functional Status: Independent at baseline  Diet: N.p.o. except ice chips  Disla: Not in place    Disposition Plan Per primary service     Interview and exam took place with the assistance of  at the bedside.      Interval History (Subjective):      Thinks he is doing well.  He denies any headache chest pain or shortness of breath.  Does have some abdominal pain which is well managed with his PCA.                  Physical Exam:      Last Vital Signs:  /84 (BP Location: Left arm)   Pulse 81   Temp 98.7  F (37.1  C) (Oral)   Resp 18   Ht 1.58 m (5' 2.21\")   Wt 77.7 kg (171 lb 3.2 oz)   SpO2 93%   BMI 31.11 kg/m      I/O: Even    Pleasant no acute distress looks stated age head is normocephalic atraumatic and sclera are clear lungs are clear to auscultation exhibits normal respiratory effort heart is of a regular rate and rhythm without murmurs rubs or gallops abdominal exam is soft nontender nondistended skin is warm dry and there is no cyanosis or clubbing of the extremities his affect is appropriate he is alert and oriented.           Medications:      All current medications were reviewed with changes reflected in problem list.         Data:      All new lab and imaging data was reviewed.   Labs:  Recent Labs   Lab 05/19/19  0705      POTASSIUM 5.1   CHLORIDE 110*   CO2 24   ANIONGAP 4   *   BUN 11   CR 1.16   GFRESTIMATED 67   GFRESTBLACK 78   LU 8.5     Recent Labs   Lab 05/19/19  0705   WBC 13.1*   HGB 9.2*   HCT 29.7*   MCV 93         Imaging:       "

## 2019-05-19 NOTE — PROGRESS NOTES
present during assessment, otherwise blue phone utilized for communication throughout the day.  Patient alert and oriented.  Up with standby assistance, abd binder.  PCA pump in place patient reports minimal discomfort with prn pca in place.  New orders for iv hydralazine d/t elevated BP, now 157/76.  Lung sounds clear.  Bowel sounds normoactive.  Not passing gas currently.  NPO.  Dressings to abd cdi, cms intact.  Will continue to monitor.

## 2019-05-19 NOTE — PROGRESS NOTES
"[facility]   General Surgery Progress Note           Assessment and Plan:   Assessment:   POD#3 s/p Procedure(s):  Exploratory laparotomy, oversew and patching of perforated gastric ulcer.        Plan:   -contrast study tomorrow, probable removal of NG after that         Interval History:            Physical Exam:   Blood pressure 156/88, pulse 81, temperature 97.7  F (36.5  C), temperature source Oral, resp. rate 18, height 1.58 m (5' 2.21\"), weight 77.7 kg (171 lb 3.2 oz), SpO2 95 %.    I/O last 3 completed shifts:  In: 2473 [I.V.:2473]  Out: 1810 [Urine:1450; Emesis/NG output:280; Drains:80]    Abdomen:   soft, non-distended, min tenderness noted     Inc(s) - clean, dry, intact      Eugene - serous          Data:     Recent Labs   Lab 05/18/19  0755 05/17/19  0628 05/16/19  0932    145* 141   POTASSIUM 4.6 4.4 4.6   CHLORIDE 113* 117* 112*   CO2 22 22 23   ANIONGAP 7 6 6   * 98 143*   BUN 16 24 24   CR 1.31* 1.52* 1.32*   GFRESTIMATED 58* 49* 58*   GFRESTBLACK 67 56* 67   LU 8.0* 7.6* 7.6*     Recent Labs   Lab 05/18/19  0755 05/17/19  1821 05/17/19  1700 05/17/19  0628  05/15/19  2234   WBC 15.9*  --   --  17.3*  --  8.9   HGB 8.4* 9.6* 9.9* 6.6*   < > 7.0*   HCT 26.9*  --   --  21.6*  --  22.6*   MCV 92  --   --  94  --  92     --   --  340  --  416    < > = values in this interval not displayed.       Michael Montez MD     "

## 2019-05-19 NOTE — PLAN OF CARE
Pt's VSS, PCA used 4 times for the shift, voiding into urinal, NG with 100 ml of green output, JOANNA with 20 ml of serosanguinous drainage, pain controlled, 96% on room air, dressing is CD&I, did not need coverage for blood sugars.

## 2019-05-19 NOTE — PLAN OF CARE
Ambulatory Status:  Pt up A1  VS:  vss  Pain:  PCA for pain control  Resp: LS clear on RA  GI:  denies nausea.  NPO with ice chips.  BS hypoactive.  Not passing gas.  Last BM unknown. NG to LIS   Skin:  dressing on abdominal incision is CDI, JOANNA in place.   Tx:  zosyn  Labs:  bg 103 and 109  Consults:  surgery,  services  Disposition:  tbd

## 2019-05-20 ENCOUNTER — OFFICE VISIT (OUTPATIENT)
Dept: INTERPRETER SERVICES | Facility: CLINIC | Age: 59
End: 2019-05-20
Payer: COMMERCIAL

## 2019-05-20 ENCOUNTER — APPOINTMENT (OUTPATIENT)
Dept: GENERAL RADIOLOGY | Facility: CLINIC | Age: 59
DRG: 329 | End: 2019-05-20
Attending: SURGERY
Payer: COMMERCIAL

## 2019-05-20 LAB
ANION GAP SERPL CALCULATED.3IONS-SCNC: 7 MMOL/L (ref 3–14)
BASOPHILS # BLD AUTO: 0.1 10E9/L (ref 0–0.2)
BASOPHILS NFR BLD AUTO: 0.7 %
BUN SERPL-MCNC: 11 MG/DL (ref 7–30)
CALCIUM SERPL-MCNC: 8.6 MG/DL (ref 8.5–10.1)
CHLORIDE SERPL-SCNC: 106 MMOL/L (ref 94–109)
CO2 SERPL-SCNC: 25 MMOL/L (ref 20–32)
CREAT SERPL-MCNC: 1.14 MG/DL (ref 0.66–1.25)
DIFFERENTIAL METHOD BLD: ABNORMAL
EOSINOPHIL # BLD AUTO: 0.4 10E9/L (ref 0–0.7)
EOSINOPHIL NFR BLD AUTO: 3.8 %
ERYTHROCYTE [DISTWIDTH] IN BLOOD BY AUTOMATED COUNT: 13.5 % (ref 10–15)
GFR SERPL CREATININE-BSD FRML MDRD: 69 ML/MIN/{1.73_M2}
GLUCOSE BLDC GLUCOMTR-MCNC: 78 MG/DL (ref 70–99)
GLUCOSE BLDC GLUCOMTR-MCNC: 88 MG/DL (ref 70–99)
GLUCOSE BLDC GLUCOMTR-MCNC: 91 MG/DL (ref 70–99)
GLUCOSE BLDC GLUCOMTR-MCNC: 94 MG/DL (ref 70–99)
GLUCOSE BLDC GLUCOMTR-MCNC: 95 MG/DL (ref 70–99)
GLUCOSE SERPL-MCNC: 104 MG/DL (ref 70–99)
HCT VFR BLD AUTO: 31.8 % (ref 40–53)
HGB BLD-MCNC: 10.2 G/DL (ref 13.3–17.7)
IMM GRANULOCYTES # BLD: 0 10E9/L (ref 0–0.4)
IMM GRANULOCYTES NFR BLD: 0.4 %
LYMPHOCYTES # BLD AUTO: 1.5 10E9/L (ref 0.8–5.3)
LYMPHOCYTES NFR BLD AUTO: 16.3 %
MAGNESIUM SERPL-MCNC: 1.8 MG/DL (ref 1.6–2.3)
MCH RBC QN AUTO: 28.8 PG (ref 26.5–33)
MCHC RBC AUTO-ENTMCNC: 32.1 G/DL (ref 31.5–36.5)
MCV RBC AUTO: 90 FL (ref 78–100)
MONOCYTES # BLD AUTO: 1 10E9/L (ref 0–1.3)
MONOCYTES NFR BLD AUTO: 10.8 %
NEUTROPHILS # BLD AUTO: 6.2 10E9/L (ref 1.6–8.3)
NEUTROPHILS NFR BLD AUTO: 68 %
NRBC # BLD AUTO: 0 10*3/UL
NRBC BLD AUTO-RTO: 0 /100
PLATELET # BLD AUTO: 464 10E9/L (ref 150–450)
POTASSIUM SERPL-SCNC: 3.8 MMOL/L (ref 3.4–5.3)
RBC # BLD AUTO: 3.54 10E12/L (ref 4.4–5.9)
SODIUM SERPL-SCNC: 138 MMOL/L (ref 133–144)
WBC # BLD AUTO: 9.1 10E9/L (ref 4–11)

## 2019-05-20 PROCEDURE — 25000128 H RX IP 250 OP 636: Performed by: INTERNAL MEDICINE

## 2019-05-20 PROCEDURE — 85025 COMPLETE CBC W/AUTO DIFF WBC: CPT | Performed by: SURGERY

## 2019-05-20 PROCEDURE — 74240 X-RAY XM UPR GI TRC 1CNTRST: CPT

## 2019-05-20 PROCEDURE — 99207 ZZC CDG-MDM COMPONENT: MEETS LOW - DOWN CODED: CPT | Performed by: INTERNAL MEDICINE

## 2019-05-20 PROCEDURE — C9113 INJ PANTOPRAZOLE SODIUM, VIA: HCPCS | Performed by: SURGERY

## 2019-05-20 PROCEDURE — T1013 SIGN LANG/ORAL INTERPRETER: HCPCS | Mod: U3

## 2019-05-20 PROCEDURE — 00000146 ZZHCL STATISTIC GLUCOSE BY METER IP

## 2019-05-20 PROCEDURE — 12000000 ZZH R&B MED SURG/OB

## 2019-05-20 PROCEDURE — 25000128 H RX IP 250 OP 636: Performed by: SURGERY

## 2019-05-20 PROCEDURE — 83735 ASSAY OF MAGNESIUM: CPT | Performed by: SURGERY

## 2019-05-20 PROCEDURE — 36415 COLL VENOUS BLD VENIPUNCTURE: CPT | Performed by: SURGERY

## 2019-05-20 PROCEDURE — 80048 BASIC METABOLIC PNL TOTAL CA: CPT | Performed by: SURGERY

## 2019-05-20 PROCEDURE — 25800025 ZZH RX 258: Performed by: INTERNAL MEDICINE

## 2019-05-20 PROCEDURE — 99232 SBSQ HOSP IP/OBS MODERATE 35: CPT | Performed by: INTERNAL MEDICINE

## 2019-05-20 RX ADMIN — HYDRALAZINE HYDROCHLORIDE 20 MG: 20 INJECTION INTRAMUSCULAR; INTRAVENOUS at 09:29

## 2019-05-20 RX ADMIN — TAZOBACTAM SODIUM AND PIPERACILLIN SODIUM 3.38 G: 375; 3 INJECTION, SOLUTION INTRAVENOUS at 21:04

## 2019-05-20 RX ADMIN — TAZOBACTAM SODIUM AND PIPERACILLIN SODIUM 3.38 G: 375; 3 INJECTION, SOLUTION INTRAVENOUS at 14:59

## 2019-05-20 RX ADMIN — DEXTROSE AND SODIUM CHLORIDE: 5; 450 INJECTION, SOLUTION INTRAVENOUS at 09:30

## 2019-05-20 RX ADMIN — TAZOBACTAM SODIUM AND PIPERACILLIN SODIUM 3.38 G: 375; 3 INJECTION, SOLUTION INTRAVENOUS at 03:30

## 2019-05-20 RX ADMIN — HYDRALAZINE HYDROCHLORIDE 20 MG: 20 INJECTION INTRAMUSCULAR; INTRAVENOUS at 17:03

## 2019-05-20 RX ADMIN — HYDRALAZINE HYDROCHLORIDE 20 MG: 20 INJECTION INTRAMUSCULAR; INTRAVENOUS at 00:34

## 2019-05-20 RX ADMIN — Medication: at 09:42

## 2019-05-20 RX ADMIN — PANTOPRAZOLE SODIUM 40 MG: 40 INJECTION, POWDER, FOR SOLUTION INTRAVENOUS at 13:02

## 2019-05-20 RX ADMIN — TAZOBACTAM SODIUM AND PIPERACILLIN SODIUM 3.38 G: 375; 3 INJECTION, SOLUTION INTRAVENOUS at 09:29

## 2019-05-20 RX ADMIN — PANTOPRAZOLE SODIUM 40 MG: 40 INJECTION, POWDER, FOR SOLUTION INTRAVENOUS at 00:33

## 2019-05-20 RX ADMIN — DIATRIZOATE MEGLUMINE AND DIATRIZOATE SODIUM 60 ML: 660; 100 SOLUTION ORAL; RECTAL at 08:58

## 2019-05-20 ASSESSMENT — ACTIVITIES OF DAILY LIVING (ADL)
ADLS_ACUITY_SCORE: 14

## 2019-05-20 ASSESSMENT — MIFFLIN-ST. JEOR: SCORE: 1421.87

## 2019-05-20 NOTE — PROGRESS NOTES
"[facility]   General Surgery Progress Note         Assessment and Plan:   Assessment:   Acute upper GI Bleed due to perforated gastric ulcer  POD#4 s/p exploratory laparotomy, oversew of gastric ulcer and omental patch.      Plan:   -await results of UGI.  Possible removal of NGT today  -pain control:  Dilaudid PCA  -abx:  IV Zosyn   -GI prophylaxis:  Protonix  -VTE prophylaxis:  PCDs. Hold anticoagulation due to bleed risk.  -increase activity - ambulate TID  -appreciate hospitalist assistance  -continue drain.  -work note faxed as requested         Interval History:   Comfortable in bed, just returned from radiology.  Denies nausea or bloating, + hungry.  Denies passing flatus.  Hoping to remove NGT and start diet today.  Using PCA sparingly for pain.  Voiding independently.           Physical Exam:   Blood pressure 151/78, pulse 81, temperature 98.7  F (37.1  C), temperature source Oral, resp. rate 18, height 1.58 m (5' 2.21\"), weight 73.4 kg (161 lb 14.4 oz), SpO2 97 %.    I/O last 3 completed shifts:  In: -   Out: 2300 [Urine:1800; Emesis/NG output:400; Drains:100]    Abdomen:   soft, +mildly distended, mild tenderness to palpation   Inc(s) -c/d/i with staples, no drainage, no erythema, +mild tenderness to palpation    Eugene - serosanguinous          Data:     Recent Labs   Lab 05/20/19  0706 05/19/19  0705 05/18/19  0755   HGB 10.2* 9.2* 8.4*     Recent Labs   Lab 05/20/19  0706 05/19/19  0705 05/18/19  0755   WBC 9.1 13.1* 15.9*       Kirsten Ortega PA-C     Seen and agree,    Mat Castro MD  [facility]            "

## 2019-05-20 NOTE — PLAN OF CARE
Ambulatory Status:  Pt up SBA  VS: temp max 99  Pain:  pca for pain  Resp: LS clear on RA  GI:  denies nausea.  NPO with ice.  BS active.  Passing flatus.  Last BM unknown.  NG in place.  Skin:  dressing on Abdomen CDI, JOANNA in place  Tx:  zosyn  Labs:  bg 78, 91  Consults:  surgery  Disposition:  tbd

## 2019-05-20 NOTE — PROGRESS NOTES
present for assessment this afternoon.  Removal of NG tube per orders, patient tolerated well.  Patient passing gas.  Remains NPO with ice chips.  Dressings to abd cdi.  CMS intact.  Blood sugar 94.  Will continue to monitor.     Addendum: JOANNA stripped per orders.

## 2019-05-20 NOTE — PLAN OF CARE
Pt is AOx4, Arabic speaking,  here this evening, VSS. Lungs clear bilaterally, bowels hypoactive, NPO except for ice chips. BG 75 and 76. Abdominal dressing is C/D/I, JOANNA draining scant amount of yellow drainage. Pain controlled with PCA dilaudid. IV fluids infusing. NG hooked up to intermittent suction. Pt  ambulated hallway with Ax1. Plan for contrast study tomorrow and probable removal of NG. Will continue to monitor.

## 2019-05-20 NOTE — PROVIDER NOTIFICATION
Call placed to [facility] to notify the results are back from procedure this am, wondering if NG tube can be removed.  If so will need orders to do so.  Waiting for call back.    Addendum: [facility] [phone]    Received call back from PA, was noted that Dr. Castro will be rounding this afternoon and will make the decision on removal of NG tube.

## 2019-05-20 NOTE — PROGRESS NOTES
[facility]  Hospitalist Progress Note  Breann Temple MD 05/20/2019    Reason for Stay (Diagnosis): abdominal pain 2/2 perforated gastric ulcer         Assessment and Plan:      Summary of Stay: Manny Manuel is a 59 year old male with a history of hypertension and type 2 diabetes admitted on 5/15/2019 with abdominal pain secondary to an acutely perforated gastric ulcer.  He underwent emergent exploratory laparotomy and oversewing and patching of the perforated gastric ulcer ulcer.    He tolerated the procedure without difficulty, he has been stable since that time.  Only issue now is that he remains hypertensive in the setting of ongoing n.p.o. status and inability to give normal blood pressure medications  Problem List:   1. Perforated gastric ulcer: Status post surgical repair, continues on twice daily PPI.  Postop NG/diet/pain control per primary service  2. Gastric ulcer: Does take high-dose naproxen at home so presumably that was the precipitating agent.  He should avoid all NSAIDs, I have added that to his allergy list  3. Peritonitis secondary to perforated viscus: Continues on Pipracillin tazobactam day 5.  4. Hypertension: PTA medications are amlodipine 5 mg p.o. daily and lisinopril 40 mg p.o. daily both are on hold in the setting of n.p.o. status with NG in place.  BPs are quite high.  Therefore added and scheduled hydralazine at 20 mg every 8 hours and now with reasonable control. Will resume po meds when NG is out  5. Type 2 diabetes: Only medication is metformin  mg p.o. Daily.  Currently only on insulin sliding scale without needs.  DVT Prophylaxis: Pneumatic Compression Devices  Code Status: Full Code  Functional Status: Independent at baseline  Diet: N.p.o. except ice chips  Disla: Not in place    Disposition Plan Per primary service     Interview and exam took place with the assistance of  at the bedside.      Interval History (Subjective):      Continues to feel  "like he is doing well, denies cp or sob, no n/v, reports minimal incisional pain.  Really really wants NG out and to have some apple juice                  Physical Exam:      Last Vital Signs:  /89 (BP Location: Left arm)   Pulse 81   Temp 98.7  F (37.1  C) (Oral)   Resp 18   Ht 1.58 m (5' 2.21\")   Wt 73.4 kg (161 lb 14.4 oz)   SpO2 97%   BMI 29.42 kg/m      I/O: -1700    Pleasant no acute distress looks stated age head is normocephalic atraumatic and sclera are clear lungs are clear to auscultation exhibits normal respiratory effort heart is of a regular rate and rhythm without murmurs rubs or gallops abdominal exam is soft hypoactive bs, but soft and nondistended skin is warm dry and there is no cyanosis or clubbing of the extremities his affect is appropriate he is alert and oriented.           Medications:      All current medications were reviewed with changes reflected in problem list.         Data:      All new lab and imaging data was reviewed.   Labs:  Recent Labs   Lab 05/20/19  0706      POTASSIUM 3.8   CHLORIDE 106   CO2 25   ANIONGAP 7   *   BUN 11   CR 1.14   GFRESTIMATED 69   GFRESTBLACK 80   LU 8.6     Recent Labs   Lab 05/20/19  0706   WBC 9.1   HGB 10.2*   HCT 31.8*   MCV 90   *      Imaging:       "

## 2019-05-20 NOTE — PROGRESS NOTES
present during assessment, otherwise I-pod  utilized for communication throughout the day.  Patient alert and oriented.  Up with standby assistance, abd binder.  PCA pump in place patient reports minimal discomfort with prn pca in place.  Lung sounds clear.  Bowel sounds active and audible.  Started passing gas this afternoon.  NPO.  Blood sugars 104 and 88.  Dressings to abd cdi (abd dressing changed), cms intact.  Will continue to monitor.

## 2019-05-21 LAB
GLUCOSE BLDC GLUCOMTR-MCNC: 100 MG/DL (ref 70–99)
GLUCOSE BLDC GLUCOMTR-MCNC: 110 MG/DL (ref 70–99)
GLUCOSE BLDC GLUCOMTR-MCNC: 135 MG/DL (ref 70–99)
GLUCOSE BLDC GLUCOMTR-MCNC: 146 MG/DL (ref 70–99)
GLUCOSE BLDC GLUCOMTR-MCNC: 91 MG/DL (ref 70–99)
GLUCOSE BLDC GLUCOMTR-MCNC: 92 MG/DL (ref 70–99)

## 2019-05-21 PROCEDURE — 25000131 ZZH RX MED GY IP 250 OP 636 PS 637: Performed by: PHYSICIAN ASSISTANT

## 2019-05-21 PROCEDURE — 99232 SBSQ HOSP IP/OBS MODERATE 35: CPT | Performed by: INTERNAL MEDICINE

## 2019-05-21 PROCEDURE — 00000146 ZZHCL STATISTIC GLUCOSE BY METER IP

## 2019-05-21 PROCEDURE — C9113 INJ PANTOPRAZOLE SODIUM, VIA: HCPCS | Performed by: SURGERY

## 2019-05-21 PROCEDURE — 12000000 ZZH R&B MED SURG/OB

## 2019-05-21 PROCEDURE — 99207 ZZC CDG-MDM COMPONENT: MEETS LOW - DOWN CODED: CPT | Performed by: INTERNAL MEDICINE

## 2019-05-21 PROCEDURE — 40000556 ZZH STATISTIC PERIPHERAL IV START W US GUIDANCE

## 2019-05-21 PROCEDURE — 25000132 ZZH RX MED GY IP 250 OP 250 PS 637: Performed by: INTERNAL MEDICINE

## 2019-05-21 PROCEDURE — 25000128 H RX IP 250 OP 636: Performed by: SURGERY

## 2019-05-21 PROCEDURE — 25000128 H RX IP 250 OP 636: Performed by: INTERNAL MEDICINE

## 2019-05-21 RX ORDER — AMLODIPINE BESYLATE 5 MG/1
5 TABLET ORAL DAILY
Status: DISCONTINUED | OUTPATIENT
Start: 2019-05-21 | End: 2019-05-22 | Stop reason: HOSPADM

## 2019-05-21 RX ORDER — HYDROMORPHONE HYDROCHLORIDE 1 MG/ML
.3-.5 INJECTION, SOLUTION INTRAMUSCULAR; INTRAVENOUS; SUBCUTANEOUS
Status: DISCONTINUED | OUTPATIENT
Start: 2019-05-21 | End: 2019-05-22 | Stop reason: HOSPADM

## 2019-05-21 RX ORDER — LISINOPRIL 40 MG/1
40 TABLET ORAL DAILY
Status: DISCONTINUED | OUTPATIENT
Start: 2019-05-21 | End: 2019-05-22

## 2019-05-21 RX ORDER — HYDRALAZINE HYDROCHLORIDE 20 MG/ML
10 INJECTION INTRAMUSCULAR; INTRAVENOUS EVERY 4 HOURS PRN
Status: DISCONTINUED | OUTPATIENT
Start: 2019-05-21 | End: 2019-05-22 | Stop reason: HOSPADM

## 2019-05-21 RX ADMIN — PANTOPRAZOLE SODIUM 40 MG: 40 INJECTION, POWDER, FOR SOLUTION INTRAVENOUS at 23:59

## 2019-05-21 RX ADMIN — INSULIN ASPART 1 UNITS: 100 INJECTION, SOLUTION INTRAVENOUS; SUBCUTANEOUS at 20:20

## 2019-05-21 RX ADMIN — TAZOBACTAM SODIUM AND PIPERACILLIN SODIUM 3.38 G: 375; 3 INJECTION, SOLUTION INTRAVENOUS at 07:57

## 2019-05-21 RX ADMIN — PANTOPRAZOLE SODIUM 40 MG: 40 INJECTION, POWDER, FOR SOLUTION INTRAVENOUS at 00:16

## 2019-05-21 RX ADMIN — PANTOPRAZOLE SODIUM 40 MG: 40 INJECTION, POWDER, FOR SOLUTION INTRAVENOUS at 13:51

## 2019-05-21 RX ADMIN — HYDRALAZINE HYDROCHLORIDE 20 MG: 20 INJECTION INTRAMUSCULAR; INTRAVENOUS at 00:16

## 2019-05-21 RX ADMIN — HYDRALAZINE HYDROCHLORIDE 20 MG: 20 INJECTION INTRAMUSCULAR; INTRAVENOUS at 07:57

## 2019-05-21 RX ADMIN — LISINOPRIL 40 MG: 40 TABLET ORAL at 19:43

## 2019-05-21 RX ADMIN — HYDRALAZINE HYDROCHLORIDE 20 MG: 20 INJECTION INTRAMUSCULAR; INTRAVENOUS at 16:18

## 2019-05-21 RX ADMIN — TAZOBACTAM SODIUM AND PIPERACILLIN SODIUM 3.38 G: 375; 3 INJECTION, SOLUTION INTRAVENOUS at 19:43

## 2019-05-21 RX ADMIN — AMLODIPINE BESYLATE 5 MG: 5 TABLET ORAL at 19:43

## 2019-05-21 RX ADMIN — TAZOBACTAM SODIUM AND PIPERACILLIN SODIUM 3.38 G: 375; 3 INJECTION, SOLUTION INTRAVENOUS at 03:35

## 2019-05-21 RX ADMIN — TAZOBACTAM SODIUM AND PIPERACILLIN SODIUM 3.38 G: 375; 3 INJECTION, SOLUTION INTRAVENOUS at 13:52

## 2019-05-21 ASSESSMENT — ACTIVITIES OF DAILY LIVING (ADL)
ADLS_ACUITY_SCORE: 14

## 2019-05-21 ASSESSMENT — MIFFLIN-ST. JEOR: SCORE: 1412.8

## 2019-05-21 NOTE — PROGRESS NOTES
"CLINICAL NUTRITION SERVICES - REASSESSMENT NOTE  Recommendations Ordered by Registered Dietitian (RD):   - Add high protein jello (20 g per cup) with meals (NO RED)    Malnutrition:   % Weight Loss:  > 10% in 6 months (severe malnutrition)  % Intake:  </= 50% for >/= 5 days (severe malnutrition)  Subcutaneous Fat Loss:  None observed  Muscle Loss:  Temporal region mild depletion and Acromion bone region mild depletion --> masked by adiposity to some degree, also likely combination age-related  Fluid Retention:  None noted    Malnutrition Diagnosis: Severe malnutrition  In Context of:  Acute illness or injury  Chronic illness or disease     EVALUATION OF PROGRESS TOWARD GOALS   Diet: CLD - Just advanced. (nothing with red dye)  Intake/Tolerance:  Assisted in ordering first liquid tray since diet advancement. Pt endorses feeling hungry. He states \"I'll probably eat it all\". Ordered chicken broth, apple juice, and yellow Gelatein (20 g protein per cup). Seen with professional .     ASSESSED NUTRITION NEEDS:  Dosing Weight 60 kg - adjusted weight  Estimated Energy Needs: 1846-0257 kcals (25-30 Kcal/Kg)  Justification: maintenance  Estimated Protein Needs: 72-90 grams protein (1.2-1.5 g pro/Kg)  Justification: preservation of lean body mass and also repletion needs  Estimated Fluid Needs: per MD    NEW FINDINGS:   POD #5 exploratory lap for perforated gastric ulcer  BM 5/20, NGT removed  5/20 UGI --> no contrast leak    - Updated weight trending: up to a 19# loss in <6 months (10.6%).   Wt Readings from Last 10 Encounters:   05/21/19 72.5 kg (159 lb 14.4 oz)   - Wt of 173# from 5/03/2019 per care everywhere.  - Wt of 179# from 12/28/2018 per care everywhere    Previous Goals:   Diet advancement vs need for consideration of more aggressive nutrition-related interventions w/in 48 hrs.  Evaluation: Not met w/in 48 hours, met now.     Previous Nutrition Diagnosis:   Inadequate oral intake related to altered GI " function post-op as evidenced by NPO x 2 days since admission, patient reporting prolonged period of not meeting even 50% usual intakes for 2 weeks PTA, coding of malnutrition with e/o muscle loss.  Evaluation: Improving, continues     MALNUTRITION  % Weight Loss:  > 10% in 6 months (severe malnutrition)  % Intake:  </= 50% for >/= 5 days (severe malnutrition)  Subcutaneous Fat Loss:  None observed  Muscle Loss:  Temporal region mild depletion and Acromion bone region mild depletion --> masked by adiposity to some degree, also likely combination age-related  Fluid Retention:  None noted    Malnutrition Diagnosis: Severe malnutrition  In Context of:  Acute illness or injury  Chronic illness or disease    CURRENT NUTRITION DIAGNOSIS  Inadequate oral intake related to altered GI function post-op as evidenced by NPO x 5 days since admission now advanced to CLD with no PO yet, patient reporting prolonged period of not meeting even 50% usual intakes for 2 weeks PTA, coding of malnutrition with e/o muscle loss.    INTERVENTIONS  Recommendations / Nutrition Prescription  Diet per GI / Surgery     Add high protein jello w/ meals     Implementation  Medical Food Supplement: see above  Collaboration and Referral of Nutrition care: patient discussed during interdisciplinary rounds. Use of professional  during visit.     Goals  Diet tolerance >CLD in the next 48 hours.     MONITORING AND EVALUATION:  Progress towards goals will be monitored and evaluated per protocol and Practice Guidelines    Oksana Abdalla RD, LD  3rd floor/ICU: [phone]  All other floors: [phone]  Weekend/holiday: [phone]  Office: [phone]

## 2019-05-21 NOTE — PROGRESS NOTES
[facility]  Hospitalist Progress Note  Breann Temple MD 05/21/2019    Reason for Stay (Diagnosis): abdominal pain 2/2 perforated gastric ulcer         Assessment and Plan:      Summary of Stay: Manny Manuel is a 59 year old male with a history of hypertension and type 2 diabetes admitted on 5/15/2019 with abdominal pain secondary to an acutely perforated gastric ulcer.  He underwent emergent exploratory laparotomy and oversewing and patching of the perforated gastric ulcer ulcer.    He tolerated the procedure without difficulty, he has been stable since that time.  Postop course only complicated by hypertension in the setting of inability to take oral medications.    NG is subsequently been removed, he is taking clear liquids, therefore will resume oral antihypertensives  Problem List:   1. Perforated gastric ulcer: Status post surgical repair, continues on twice daily PPI.  Postop NG/diet/pain control per primary service  2. Gastric ulcer: Does take high-dose naproxen at home so presumably that was the precipitating agent.  He should avoid all NSAIDs, I have added that to his allergy list-continue twice daily PPI  3. Peritonitis secondary to perforated viscus: Continues on Pipracillin tazobactam day 6.  4. Hypertension: PTA medications are amlodipine 5 mg p.o. daily and lisinopril 40 mg p.o. daily both were held in the setting of n.p.o. status with NG in place.  BPs are quite high.  Therefore added and scheduled hydralazine at 20 mg every 8 hours and now with reasonable control.  Now NG is out.  So will resume his amlodipine and lisinopril.  I have changed his hydralazine to 10mg every 4 hours as needed hypertension for SBP greater than 180.  Check a basic metabolic panel in the morning  5. Type 2 diabetes: Only medication is metformin  mg p.o. Daily.  Currently only on insulin sliding scale without needs.  Consider resumption of this medication while eating more consistently  DVT Prophylaxis:  "Pneumatic Compression Devices  Code Status: Full Code  Functional Status: Independent at baseline  Diet: N.p.o. except ice chips  Disla: Not in place    Disposition Plan Per primary service     Interview and exam took place with the assistance of  at the bedside.      Interval History (Subjective):      Super happy that his NG is out.  Now is super hungry and he wants to eat.  Denies chest pain headache shortness of breath nausea vomiting or diarrhea.  Is passing gas.  He denies abdominal pain except for minimal incisional pain                  Physical Exam:      Last Vital Signs:  /68 (BP Location: Left arm)   Pulse 77   Temp 96.8  F (36  C) (Oral)   Resp 18   Ht 1.58 m (5' 2.21\")   Wt 72.5 kg (159 lb 14.4 oz)   SpO2 99%   BMI 29.05 kg/m      I/O: -1100    Pleasant no acute distress looks stated age head is normocephalic atraumatic and sclera are clear lungs are clear to auscultation exhibits normal respiratory effort heart is of a regular rate and rhythm without murmurs rubs or gallops abdominal exam is soft hypoactive bs, but soft and nondistended skin is warm dry and there is no cyanosis or clubbing of the extremities his affect is appropriate he is alert and oriented.           Medications:      All current medications were reviewed with changes reflected in problem list.         Data:      All new lab and imaging data was reviewed.   Labs:  Recent Labs   Lab 05/20/19  0706      POTASSIUM 3.8   CHLORIDE 106   CO2 25   ANIONGAP 7   *   BUN 11   CR 1.14   GFRESTIMATED 69   GFRESTBLACK 80   LU 8.6     Recent Labs   Lab 05/20/19  0706   WBC 9.1   HGB 10.2*   HCT 31.8*   MCV 90   *      Imaging:       "

## 2019-05-21 NOTE — CONSULTS
GASTROENTEROLOGY CONSULTATION      Manny Manuel  [id number] [facility] [id number]  59 year old male     Admission Date/Time: 5/15/2019  Primary Care Provider: [facility]  Referring / Attending Physician:  Lucas STUBBS     We were asked to see the patient in consultation by Lucas STUBBS for evaluation of follow up perforated ulcer.        HPI:  Manny Manuel is a 59 year old male with medical history of hypertension and type 2 diabetes mellitus admitted 5/15 with abdominal pain found to have a perforated gastric ulcer.  He underwent emergent exploratory laparotomy oversewing with patching.  Gastroenterology was consulted for follow-up upper endoscopy.    Patient is [place]-speaking.  Interview was conducted through professional .  Patient reports that his abdominal pain is controlled.  He denies any nausea or vomiting.  No fevers and no chills.  He has been passing bowel movements.  Currently only on clear liquids.    Patient denies any history of peptic ulcer disease.  He is unaware of any past diagnosis of H. pylori.  He does report struggling with chronic knee pain and having to be in his feet for 10 hours a day with his work.  For the past number of months he has been using 2 naproxen daily.        PAST MEDICAL HISTORY:  Patient Active Problem List    Diagnosis Date Noted     Acute perforated gastric ulcer with hemorrhage (H) 05/16/2019     Priority: Medium          ROS: A comprehensive ten point review of systems was negative aside from those in mentioned in the HPI.       MEDICATIONS:   Prior to Admission medications    Medication Sig Start Date End Date Taking? Authorizing Provider   amLODIPine (NORVASC) 5 MG tablet Take 5 mg by mouth daily   Yes Reported, Patient   lisinopril (PRINIVIL/ZESTRIL) 40 MG tablet Take 40 mg by mouth daily   Yes Reported, Patient   metFORMIN (GLUCOPHAGE-XR) 500 MG 24 hr tablet Take 500 mg by mouth daily   Yes Unknown, Entered By History   naproxen  "sodium (ANAPROX) 220 MG tablet Take 440 mg by mouth daily as needed for moderate pain   Yes Unknown, Entered By History        ALLERGIES:   Allergies   Allergen Reactions     Nsaids      Perforated gastric ulcer        SOCIAL HISTORY:  Social History     Tobacco Use     Smoking status: Never Smoker     Smokeless tobacco: Never Used   Substance Use Topics     Alcohol use: None     Drug use: None        FAMILY HISTORY:  History reviewed. No pertinent family history.     PHYSICAL EXAM:     /60 (BP Location: Left arm)   Pulse 77   Temp 97.9  F (36.6  C) (Oral)   Resp 16   Ht 1.58 m (5' 2.21\")   Wt 72.5 kg (159 lb 14.4 oz)   SpO2 96%   BMI 29.05 kg/m       PHYSICAL EXAM:  GENERAL:  NAD  SKIN: no suspicious lesions, rashes, jaundice  HEAD: Normocephalic. Atraumatic.  NECK: Neck supple. No adenopathy.   EYES: No scleral icterus  RESPIRATORY: Good transmission. CTA bilaterally.   CARDIOVASCULAR: RRR, normal S1, S2,  No murmur appreciated  GASTROINTESTINAL: +BS, soft, appropriately tender, non distended, no guarding/rebound  JOINT/EXTREMITIES:  no gross deformities noted, normal muscle tone  NEURO: CN 2-12 grossly intact, no focal deficits  PSYCH: Normal affect              ADDITIONAL COMMENTS:   I reviewed the patient's new clinical lab test results.   Recent Labs   Lab Test 05/20/19  0706 05/19/19  0705 05/18/19  0755   WBC 9.1 13.1* 15.9*   HGB 10.2* 9.2* 8.4*   MCV 90 93 92   * 424 370     Recent Labs   Lab Test 05/20/19  0706 05/19/19  0705 05/18/19  0755   POTASSIUM 3.8 5.1 4.6   CHLORIDE 106 110* 113*   CO2 25 24 22   BUN 11 11 16   ANIONGAP 7 4 7     Recent Labs   Lab Test 05/15/19  2234   ALBUMIN 3.2*   BILITOTAL 0.4   ALT 20   AST 17   LIPASE 195        IMAGING / ENDOSCOPY     CT AORTIC SURVEY W CONTRAST  5/16/2019 12:27 AM      IMPRESSION:  1. Free intraperitoneal gas which appears to be due to a perforated  ulcer in the distal stomach.  2. No aortic aneurysm or dissection.  3. Small amount of " ascites.      US ABDOMEN LIMITED  5/15/2019 11:36 PM       FINDINGS: The liver is normal in size and texture without focal mass.  There is no intra or extrahepatic biliary dilatation. The common  hepatic duct measures 0.6 cm. The gallbladder is normal in appearance  without gallstones. The pancreas is completely obscured by bowel gas.  The right kidney measures 9.6 cm. There is a 0.8 cm cyst in the upper  pole of the kidney. The proximal abdominal aorta and IVC appear  normal.                                                                IMPRESSION: Normal right upper quadrant. No gallstone or biliary  dilatation.       CONSULTATION ASSESSMENT AND PLAN:    Manny Manuel is a 59 year old is a 59 year old male with medical history of hypertension and type 2 diabetes mellitus admitted 5/15 with abdominal pain found to have a perforated gastric ulcer.  He underwent emergent exploratory laparotomy oversewing with patching.  Gastroenterology was consulted for follow-up upper endoscopy.    1. Perforated gastric ulcer: ongoing management per surgery. My office will contact patient for EGD in 2 months for follow up. H.Pylori less likely with gastric ulcer. Suspect daily Naproxen use as cause. Will see patient in outpatient follow up for further evaluation.      I discussed the patient plan with Dr. Lucas. Thank you for asking us to participate in the care of this patient. Will no longer follow.    Lynnette Wick PA-C  [facility] ( [facility])

## 2019-05-21 NOTE — PROVIDER NOTIFICATION
Can parameters be placed for pt's scheduled IV hydralazine?    Admitting Hospitalist paged    Parameters placed

## 2019-05-21 NOTE — PLAN OF CARE
Patient alert and oriented  Peripheral IV infusing x1  Peripheral IV saline locked x1  No reports of pain, using PCA minimally  Up assist standby, ambulated in hallway x1 this shift  Tolerating ice chips, no nausea  CMS intact, no numbness/tingling  Abdomen soft/nontender  Dressing CDI  JOANNA to bulb suction  Lungs clear  Positive BS, passing gas  1 BM today per patient   iPad used for assessment, at bedside  Continue with plan of care

## 2019-05-21 NOTE — PROGRESS NOTES
"[facility]   General Surgery Progress Note         Assessment and Plan:   Assessment:   -Acute upper GI Bleed due to perforated gastric ulcer  -POD#5 s/p exploratory laparotomy, oversew of gastric ulcer and omental patch.      Plan:   -GI consult to weigh in on ulcer disease/bleed.  Question need for H.Pylori eval/treatment.  Also will need future endoscopy.    -Diet: OK to start clear liquids, no red color.  -pain control:  Dilaudid IV prn  -abx:  IV Zosyn   -GI treatment:  Protonix  -VTE prophylaxis:  PCDs. Hold anticoagulation due to bleed risk.  -increase activity - ambulate TID  -appreciate hospitalist assistance  -continue drain.  -5/20 work note faxed as requested  -Dispo: Await diet tolerance         Interval History:   *Seen with .  Comfortable in bed.  Tolerating NG out without any nausea.  Passing flatus and +BM.  Ambulating well to bathroom.  Discussed starting diet, slow at first, no red colors.  Reviewed pre-op NSAID use, only a couple times a month as needed for knee pain while working.  Non-smoker, no alcohol use.  Reviewed long term restrictions with patient.           Physical Exam:   Blood pressure 122/60, pulse 77, temperature 97.9  F (36.6  C), temperature source Oral, resp. rate 16, height 1.58 m (5' 2.21\"), weight 72.5 kg (159 lb 14.4 oz), SpO2 96 %.    I/O last 3 completed shifts:  In: 1279 [I.V.:1279]  Out: 665 [Urine:300; Emesis/NG output:300; Drains:65]    Abdomen:   soft, non-distended, mild tenderness to palpation   Inc -c/d/i with staples, no drainage, no erythema   Eugene - serosanguinous          Data:     Recent Labs   Lab 05/20/19  0706 05/19/19  0705 05/18/19  0755   HGB 10.2* 9.2* 8.4*     Recent Labs   Lab 05/20/19  0706 05/19/19  0705 05/18/19  0755   WBC 9.1 13.1* 15.9*       Milagros Zavala PA-C     Seen and agree,    Mat Castro MD  [facility]              "

## 2019-05-21 NOTE — PLAN OF CARE
Patient having minimal pain today and PCA dc'd, used  video I pad today for assessment and discussion of goal of care, lost both IV accesses today and new IV placed by vascular access team, continues on zosyn, changed from NPO to clear liquid and now advanced to full liquids and tolerated clear liquids with no c/o nausea, BS hypo/flatus+ and BM yesterday, walked in weinberg x1, could probably walk in halls independently now, midline incision c/d/I and right JOANNA patent with 30cc out,

## 2019-05-21 NOTE — PLAN OF CARE
POD 5 exp lap for perforated gastric ulcer  BS active, passing flatus   Midline dressing CDI  JOANNA on right side, dressing CDI, minimal output  Vital signs stable, on RA, denies any pain/nausea  Scheduled IV Hydralazine and Protonix given  PCD Dilaudid 0.2/10/1.8, 0.2mg used   PIV IVF 50/hr, continues Zosyn   Up with SBA   BG checks q 4 hrs, 92, 91 no coverage

## 2019-05-22 ENCOUNTER — OFFICE VISIT (OUTPATIENT)
Dept: INTERPRETER SERVICES | Facility: CLINIC | Age: 59
End: 2019-05-22
Payer: COMMERCIAL

## 2019-05-22 VITALS
HEART RATE: 84 BPM | WEIGHT: 160.3 LBS | RESPIRATION RATE: 20 BRPM | OXYGEN SATURATION: 99 % | DIASTOLIC BLOOD PRESSURE: 67 MMHG | SYSTOLIC BLOOD PRESSURE: 123 MMHG | HEIGHT: 62 IN | BODY MASS INDEX: 29.5 KG/M2 | TEMPERATURE: 98.5 F

## 2019-05-22 LAB
ANION GAP SERPL CALCULATED.3IONS-SCNC: 5 MMOL/L (ref 3–14)
BUN SERPL-MCNC: 10 MG/DL (ref 7–30)
CALCIUM SERPL-MCNC: 8.7 MG/DL (ref 8.5–10.1)
CHLORIDE SERPL-SCNC: 109 MMOL/L (ref 94–109)
CO2 SERPL-SCNC: 25 MMOL/L (ref 20–32)
CREAT SERPL-MCNC: 1.29 MG/DL (ref 0.66–1.25)
GFR SERPL CREATININE-BSD FRML MDRD: 59 ML/MIN/{1.73_M2}
GLUCOSE BLDC GLUCOMTR-MCNC: 100 MG/DL (ref 70–99)
GLUCOSE BLDC GLUCOMTR-MCNC: 103 MG/DL (ref 70–99)
GLUCOSE BLDC GLUCOMTR-MCNC: 105 MG/DL (ref 70–99)
GLUCOSE BLDC GLUCOMTR-MCNC: 113 MG/DL (ref 70–99)
GLUCOSE BLDC GLUCOMTR-MCNC: 178 MG/DL (ref 70–99)
GLUCOSE SERPL-MCNC: 110 MG/DL (ref 70–99)
POTASSIUM SERPL-SCNC: 3.9 MMOL/L (ref 3.4–5.3)
SODIUM SERPL-SCNC: 139 MMOL/L (ref 133–144)

## 2019-05-22 PROCEDURE — 25000132 ZZH RX MED GY IP 250 OP 250 PS 637: Performed by: PHYSICIAN ASSISTANT

## 2019-05-22 PROCEDURE — 36415 COLL VENOUS BLD VENIPUNCTURE: CPT | Performed by: INTERNAL MEDICINE

## 2019-05-22 PROCEDURE — 00000146 ZZHCL STATISTIC GLUCOSE BY METER IP

## 2019-05-22 PROCEDURE — 25000128 H RX IP 250 OP 636: Performed by: PHYSICIAN ASSISTANT

## 2019-05-22 PROCEDURE — T1013 SIGN LANG/ORAL INTERPRETER: HCPCS | Mod: U3

## 2019-05-22 PROCEDURE — 25000132 ZZH RX MED GY IP 250 OP 250 PS 637: Performed by: SURGERY

## 2019-05-22 PROCEDURE — 25000132 ZZH RX MED GY IP 250 OP 250 PS 637: Performed by: INTERNAL MEDICINE

## 2019-05-22 PROCEDURE — 80048 BASIC METABOLIC PNL TOTAL CA: CPT | Performed by: INTERNAL MEDICINE

## 2019-05-22 PROCEDURE — 99232 SBSQ HOSP IP/OBS MODERATE 35: CPT | Performed by: INTERNAL MEDICINE

## 2019-05-22 PROCEDURE — 25000128 H RX IP 250 OP 636: Performed by: SURGERY

## 2019-05-22 RX ORDER — OXYCODONE HYDROCHLORIDE 5 MG/1
5 TABLET ORAL EVERY 4 HOURS PRN
Status: DISCONTINUED | OUTPATIENT
Start: 2019-05-22 | End: 2019-05-22 | Stop reason: HOSPADM

## 2019-05-22 RX ORDER — PANTOPRAZOLE SODIUM 40 MG/1
40 TABLET, DELAYED RELEASE ORAL
Qty: 30 TABLET | Refills: 1 | Status: SHIPPED | OUTPATIENT
Start: 2019-05-22 | End: 2019-07-21

## 2019-05-22 RX ORDER — ACETAMINOPHEN 325 MG/1
650 TABLET ORAL EVERY 6 HOURS
Qty: 80 TABLET | Refills: 0 | Status: SHIPPED | OUTPATIENT
Start: 2019-05-22 | End: 2019-06-01

## 2019-05-22 RX ORDER — OXYCODONE HYDROCHLORIDE 5 MG/1
5 TABLET ORAL EVERY 4 HOURS PRN
Qty: 15 TABLET | Refills: 0 | Status: SHIPPED | OUTPATIENT
Start: 2019-05-22

## 2019-05-22 RX ORDER — PANTOPRAZOLE SODIUM 40 MG/1
40 TABLET, DELAYED RELEASE ORAL
Status: DISCONTINUED | OUTPATIENT
Start: 2019-05-22 | End: 2019-05-22 | Stop reason: HOSPADM

## 2019-05-22 RX ORDER — ACETAMINOPHEN 325 MG/1
650 TABLET ORAL EVERY 6 HOURS PRN
Status: DISCONTINUED | OUTPATIENT
Start: 2019-05-22 | End: 2019-05-22

## 2019-05-22 RX ORDER — ACETAMINOPHEN 325 MG/1
650 TABLET ORAL EVERY 6 HOURS SCHEDULED
Status: DISCONTINUED | OUTPATIENT
Start: 2019-05-22 | End: 2019-05-22 | Stop reason: HOSPADM

## 2019-05-22 RX ADMIN — AMLODIPINE BESYLATE 5 MG: 5 TABLET ORAL at 08:43

## 2019-05-22 RX ADMIN — TAZOBACTAM SODIUM AND PIPERACILLIN SODIUM 3.38 G: 375; 3 INJECTION, SOLUTION INTRAVENOUS at 07:37

## 2019-05-22 RX ADMIN — PANTOPRAZOLE SODIUM 40 MG: 40 TABLET, DELAYED RELEASE ORAL at 16:43

## 2019-05-22 RX ADMIN — TAZOBACTAM SODIUM AND PIPERACILLIN SODIUM 3.38 G: 375; 3 INJECTION, SOLUTION INTRAVENOUS at 14:27

## 2019-05-22 RX ADMIN — PANTOPRAZOLE SODIUM 40 MG: 40 TABLET, DELAYED RELEASE ORAL at 08:47

## 2019-05-22 RX ADMIN — OXYCODONE HYDROCHLORIDE 5 MG: 5 TABLET ORAL at 08:42

## 2019-05-22 RX ADMIN — ACETAMINOPHEN 650 MG: 325 TABLET, FILM COATED ORAL at 13:39

## 2019-05-22 RX ADMIN — TAZOBACTAM SODIUM AND PIPERACILLIN SODIUM 3.38 G: 375; 3 INJECTION, SOLUTION INTRAVENOUS at 02:21

## 2019-05-22 RX ADMIN — LISINOPRIL 40 MG: 40 TABLET ORAL at 08:43

## 2019-05-22 RX ADMIN — INSULIN ASPART 1 UNITS: 100 INJECTION, SOLUTION INTRAVENOUS; SUBCUTANEOUS at 13:40

## 2019-05-22 RX ADMIN — Medication 0.5 MG: at 00:11

## 2019-05-22 ASSESSMENT — ACTIVITIES OF DAILY LIVING (ADL)
ADLS_ACUITY_SCORE: 14

## 2019-05-22 ASSESSMENT — MIFFLIN-ST. JEOR: SCORE: 1414.62

## 2019-05-22 NOTE — DISCHARGE INSTRUCTIONS
HOME CARE FOLLOWING ABDOMINAL SURGERY  BERTHA Enriquez N. Guttormson, D. Maurer, R. O Donnell, J. Shaheen    Special instructions for Manny Manuel:  --RETURN APPOINTMENTS: Surgery Office Phone:  [phone]  -Schedule an appointment for removal of staples at 14 days after day of surgery.  -Schedule a follow-up visit 2-3 weeks after discharge from the hospital.    --No smoking, alcohol, caffeine, anti-inflammatory medication or aspirin use.  --Take Protonix twice a day.  Follow-up with Primary care provider for continued refills of this medication.  You should stay on this indefinitely.       INCISIONAL CARE:  Replace the bandage over your incision and previous drain site DAILY until all drainage stops, or if more comfortable to have in place.  If you have staples in your incision at the time of discharge, they will be removed at your follow-up appointment.     BATHING:  Avoid baths for 1 week after surgery.  Showers are okay.  You may wash your hair at any time.  Gently pat your incision dry after bathing.    ACTIVITY:  Light Activity -- you may immediately be up and about as tolerated.  Driving -- you may drive when comfortable and off narcotic pain medications.  Light Work -- resume when comfortable off pain medications.  (If you can drive, you probably can work.)  Strenuous Work/Activity -- limit lifting to 20 pounds for 4 weeks.  Then, progressively increase with time.  Active Sports (running, biking, etc.) -- cautiously resume after 6 weeks.    DISCOMFORT:  Use pain medications as prescribed by your surgeon.  Take the pain medication with some food, when possible, to minimize side effects.  Expect gradual improvement.    DIET:  Return to diet you were on before surgery, unless you are given specific diet instructions.  Drink plenty of fluids.  While taking pain medications, increase dietary fiber or add a fiber supplementation like Metamucil or Citrucel to help prevent constipation - a possible side  effect of pain medications.    NAUSEA:  If nauseated from the anesthetic/pain meds; rest in bed, get up cautiously with assistance, and drink clear liquids (juice, tea, broth).     CONTACT US IF THE FOLLOWING DEVELOPS:   1. A fever that is above 101     2. If there is a large amount of drainage, bleeding, or swelling.   3. Severe pain that is not relieved by your prescription.   4. Drainage that is thick, cloudy, yellow, green or white.   5. Any other questions not answered by  Frequently Asked Questions  sheet.      FREQUENTLY ASKED QUESTIONS:    Q:  How should my incision look?    A:  Normally your incision will appear slightly swollen with light redness directly along the incision itself as it heals.  It may feel like a bump or ridge as the healing/scarring happens, and over time (3-4 months) this bump or ridge feeling should slowly go away.  In general, clear or pink watery drainage can be normal at first as your incision heals, but should decrease over time.    Q:  How do I know if my incision is infected?  A:  Look at your incision for signs of infection, like redness around the incision spreading to surrounding skin, or drainage of cloudy or foul-smelling drainage.  If you feel warm, check your temperature to see if you are running a fever.    **If any of these things occur, please notify the nurse at our office.  We may need you to come into the office for an incision check.      Q:  How do I take care of my incision?  A:  If you have a dressing in place - Starting the day after surgery, replace the dressing 1-2 times a day until there is no further drainage from the incision.  At that time, a dressing is no longer needed.  Try to minimize tape on the skin if irritation is occurring at the tape sites.  If you have significant irritation from tape on the skin, please call the office to discuss other method of dressing your incision.    Small pieces of tape called  steri-strips  may be present directly  overlying your incision; these may be removed 10 days after surgery unless otherwise specified by your surgeon.  If these tapes start to loosen at the ends, you may trim them back until they fall off or are removed.    A:  If you had  Dermabond  tissue glue used as a dressing (this causes your incision to look shiny with a clear covering over it) - This type of dressing wears off with time and does not require more dressings over the top unless it is draining around the glue as it wears off.  Do not apply ointments or lotions over the incisions until the glue has completely worn off.    Q:  There is a piece of tape or a sticky  lead  still on my skin.  Can I remove this?  A:  Sometimes the sticky  leads  used for monitoring during surgery or for evaluation in the emergency department are not all removed while you are in the hospital.  These sometimes have a tab or metal dot on them.  You can easily remove these on your own, like taking off a band-aid.  If there is a gel substance under the  lead , simply wipe/clean it off with a washcloth or paper towel.      Q:  What can I do to minimize constipation (very hard stools, or lack of stools)?  A:  Stay well hydrated.  Increase your dietary fiber intake or take a fiber supplement -with plenty of water.  Walk around frequently.  You may consider an over-the-counter stool-softener.  Your Pharmacist can assist you with choosing one that is stocked at your pharmacy.  Constipation is also one of the most common side effects of pain medication.  If you are using pain medication, be pro-active and try to PREVENT problems with constipation by taking the steps above BEFORE constipation becomes a problem.    Q:  What do I do if I need more pain medications?  A:  Call the office to receive refills.  Be aware that certain pain meds cannot be called into a pharmacy and actually require a paper prescription.  A change may be made in your pain med as you progress thru your recovery  period or if you have side effects to certain meds.    --Pain meds are NOT refilled after 5pm on weekdays, and NOT AT ALL on the weekends, so please look ahead to prevent problems.      Q:  Why am I having a hard time sleeping now that I am at home?  A:  Many medications you receive while you are in the hospital can impact your sleep for a number of days after your surgery/hospitalization.  Decreased level of activity and naps during the day may also make sleeping at night difficult.  Try to minimize day-time naps, and get up frequently during the day to walk around your home during your recovery time.  Sleep aides may be of some help, but are not recommended for long-term use.      Q:  I am having some back discomfort.  What should I do?  A:  This may be related to certain positioning that was required for your surgery, extended periods of time in bed, or other changes in your overall activity level.  You may try ice, heat, acetaminophen, or ibuprofen to treat this temporarily.  Note that many pain medications have acetaminophen in them and would state this on the prescription bottle.  Be sure not to exceed the maximum of 4000mg per day of acetaminophen.     **If the pain you are having does not resolve, is severe, or is a flare of back pain you have had on other occasions prior to surgery, please contact your primary physician for further recommendations or for an appointment to be examined at their office.    Q:  Why am I having headaches?  A:  Headaches can be caused by many things:  caffeine withdrawal, use of pain meds, dehydration, high blood pressure, lack of sleep, over-activity/exhaustion, flare-up of usual migraine headaches.  If you feel this is related to muscle tension (a band-like feeling around the head, or a pressure at the low-back of the head) you may try ice or heat to this area.  You may need to drink more fluids (try electrolyte drink like Gatorade), rest, or take your usual migraine  medications.   **If your headaches do not resolve, worsen, are accompanied by other symptoms, or if your blood pressure is high, please call your primary physician for recommendation and/or examination.    Q:  I am unable to urinate.  What do I do?  A:  A small percentage of people can have difficulty urinating initially after surgery.  This includes being able to urinate only a very small amount at a time and feeling discomfort or pressure in the very low abdomen.  This is called  urinary retention , and is actually an urgent situation.  Proceed to your nearest Emergency department for evaluation (not an Urgent Care Center).  Sometimes the bladder does not work correctly after certain medications you receive during surgery, or related to certain procedures.  You may need to have a catheter placed until your bladder recovers.  When planning to go to an Emergency department, it may help to call the ER to let them know you are coming in for this problem after a surgery.  This may help you get in quicker to be evaluated.  **If you have symptoms of a urinary tract infection, please contact your primary physician for the proper evaluation and treatment.          If you have other questions, please call the office Monday thru Friday between 8am and 5pm to discuss with the nurse or physician assistant.  #[phone]    There is a surgeon ON CALL on weekday evenings and over the weekend in case of urgent need only, and may be contacted at the same number.    If you are having an emergency, call 911 or proceed to your nearest emergency department.

## 2019-05-22 NOTE — PLAN OF CARE
Patient hospitalized for GI bleed. Cleared for discharge to home today per MD. Discharge instructions, medications, and follow-ups reviewed with patient in detail with  present. Discharge medications, including tylenol, Augmentin, oxycodone, Protonix were filled and given to patient at discharge. Patient verbalized understanding of discharge instructions. Belongings were returned to patient at time of discharge. Wife providing transport home.

## 2019-05-22 NOTE — PLAN OF CARE
Up in room and halls ----tolerating soft diet denies nausea -- medicated for pain x1 ---- voiding without problems ----  planning discharge this evening

## 2019-05-22 NOTE — PLAN OF CARE
POD 6 exp lap  Vital signs stable, soft BP's on RA  C/O abdominal pain 3/10 IV Dilaudid x 1 given with relief  PIV IVF 50/hr, continues IV Zosyn and Protonix   Up with SBA, voiding without difficulty  BS hypo, passing flatus, denies any nausea   BG checks q 4 hrs 100, 105  JOANNA on right side 15mL output, dressing CDI   Midline dressing CDI

## 2019-05-22 NOTE — PROGRESS NOTES
"[facility]   General Surgery Progress Note         Assessment and Plan:   Assessment:   -Acute upper GI Bleed due to perforated gastric ulcer  -POD#6 s/p exploratory laparotomy, oversew of gastric ulcer and omental patch.      Plan:   -Diet: soft diet, small meals  -pain control:  Oral oxycodone, acetaminophen  -abx:  IV Zosyn   -GI treatment:  Protonix  -VTE prophylaxis:  PCDs. Hold anticoagulation due to bleed risk.  -appreciate hospitalist assistance  -Daily dressing change at previous drain site  -5/20 work note faxed as requested  -Dispo: OK to discharge home when tolerating soft diet and pain management adequate on oral meds.  Possible discharge later today if cleared by other services.  DC Rx: oxycodone, Protonix, Augmentin 5d.  OTC bowel program prn.    -RTC in 8 d for removal of staples.  RTC 2-3 weeks for postop appt.  Discharge instructions were reviewed with the patient in detail.  All his questions/concerns were addressed.  He is aware that a printed copy of instructions will be given to him upon discharge.  -Follow-up with GI per recommendations, EGD in 2 months.         Interval History:   *Seen with .  Comfortable in chair, moves easily within room.  Tolerating diet, hungry for more.  Passing flatus and +BM.  Ambulating well.  Recent transition to oral oxycodone, working well for incisional pain.         Physical Exam:   Blood pressure 155/82, pulse 84, temperature 97.3  F (36.3  C), temperature source Oral, resp. rate 20, height 1.58 m (5' 2.21\"), weight 72.7 kg (160 lb 4.8 oz), SpO2 97 %.    I/O last 3 completed shifts:  In: 2132 [P.O.:960; I.V.:1172]  Out: 560 [Urine:500; Drains:60]    Abdomen:   soft, non-distended, slight tenderness around incision only  Inc -c/d/i with staples, no drainage, no erythema, no dressing needed   Eugene - serosanguinous, scant output in bulb.  Drain removed without issues.  Dressing placed.          Data:     Recent Labs   Lab 05/20/19  0706 " 05/19/19  0705 05/18/19  0755   HGB 10.2* 9.2* 8.4*     Recent Labs   Lab 05/20/19  0706 05/19/19  0705 05/18/19  0755   WBC 9.1 13.1* 15.9*       FLY WillsonC     Pt seen sitting up in chair. Ate regular food for lunch, feels well. Pain 3/10 after oxycodone. Incision c/d/i  Discharge this evening, pt agrees    Mindy Vora MD

## 2019-05-22 NOTE — PROGRESS NOTES
[facility]    Hospitalist Progress Note      Assessment & Plan   Manny Manuel is a 59 year old male who was admitted on 5/15/2019.  Past medical history significant for hypertension and type 2 diabetes mellitus.  Patient admitted on 5/15 with abdominal pain and found to have a perforated gastric ulcer.  Admitted to general surgery service and underwent emergent exploratory laparotomy with oversewing and patching.  Hospitalist service consulted for medical comanagement.    Perforated gastric ulcer  Peritonitis secondary to perforated viscus  Acute blood loss anemia  Patient is postoperative day #6 for exploratory laparotomy with oversewing and omental patching of perforated gastric ulcer.  Continue PPI twice daily.  Etiology likely the ongoing naproxen patient had been taking.  Instructed to avoid all NSAIDs in the future.  Diet per surgery.  Patient is currently tolerating soft diet.  Pain management per surgery.  Patient to continue antibiotics on discharge per surgery following perforated gastric ulcer with peritonitis.  Patient should follow-up with GI in 2 months to have follow-up EGD to document ulcer healing and consider H. pylori testing as needed.  Dr. Lucas, GI, notes that his office will contact patient to schedule.    Hypertension  Continue PTA amlodipine on discharge.  Patient with acute kidney injury on admission.  PTA lisinopril had been resumed on 5/22, however with a slight increase in creatinine from 1.14-1.29, will hold on discharge.  Recommend rechecking renal function with primary care in 1 week and likely resume lisinopril at that time.    Acute kidney injury superimposed on CKD stage II  Creatinine on presentation was 1.54.  BUN also increased though this was in setting of upper GI bleed.  Likely secondary to acute blood loss anemia associated with GI bleed.  Recent baseline renal function in care everywhere was 1.13.  Creatinine did increase slightly to 1.29 on day of discharge.   Patient's diet has been advanced and is tolerating soft diet at this time.  Encourage p.o. intake and oral fluids.  Will hold patient's PTA lisinopril at discharge and recommend repeating renal function with primary care in 1 week.    Type 2 diabetes mellitus  Resume PTA metformin at discharge.    DVT Prophylaxis: Pneumatic Compression Devices  Code Status: Full Code  Expected discharge: Patient is medically able to be discharged at this time per hospitalist service.  Discharge will be per primary surgical team.    Charlene Grace MD FACP  Hospitalist Service  [facility]  Text Page (7am - 6pm)      Interval History   Drain removed by surgery without issues.  Dressing placed.  Tolerating soft diet.  Pain well controlled with oral medications.  Patient without additional complaints.  Otherwise no acute events.    -Data reviewed today: I reviewed all new labs and imaging results over the last 24 hours.      Physical Exam   Temp: 97.3  F (36.3  C) Temp src: Oral BP: 155/82 Pulse: 84 Heart Rate: 85 Resp: 20 SpO2: 97 % O2 Device: None (Room air)    Vitals:    05/20/19 0620 05/21/19 0528 05/22/19 0517   Weight: 73.4 kg (161 lb 14.4 oz) 72.5 kg (159 lb 14.4 oz) 72.7 kg (160 lb 4.8 oz)     Vital Signs with Ranges  Temp:  [96.8  F (36  C)-97.9  F (36.6  C)] 97.3  F (36.3  C)  Pulse:  [84] 84  Heart Rate:  [81-85] 85  Resp:  [18-20] 20  BP: (101-155)/(58-82) 155/82  SpO2:  [95 %-99 %] 97 %  I/O last 3 completed shifts:  In: 2132 [P.O.:960; I.V.:1172]  Out: 560 [Urine:500; Drains:60]    Constitutional: Alert and oriented x3. No acute distress.  Sitting up in chair, eating.  present in room.  Nontoxic.  HEENT: NCAT. EOMI. Moist oral mucosa.  Respiratory: Clear to auscultation bilaterally. No crackles or wheezes.  Cardiovascular: Regular rate and rhythm. No murmur.  GI: Midline incision well healing with staples.  Clean and dry.  Right-sided dressing at site of previous drain is clean and dry.   Abdomen is soft and nontender at time of exam.  Normoactive bowel sounds.    Musculoskeletal: No gross deformities. No peripheral edema.  Neurologic: Alert and oriented x3. No focal neurologic deficits.       Medications     dextrose 5% and 0.45% NaCl 50 mL/hr at 05/21/19 2356       acetaminophen  650 mg Oral Q6H ANNETTE     amLODIPine  5 mg Oral Daily     insulin aspart  1-6 Units Subcutaneous Q4H     pantoprazole  40 mg Oral BID AC     piperacillin-tazobactam  3.375 g Intravenous Q6H     sodium chloride (PF)  3 mL Intracatheter Q8H       Data   Recent Labs   Lab 05/22/19  0629 05/20/19  0706 05/19/19  0705 05/18/19  0755  05/15/19  2234   WBC  --  9.1 13.1* 15.9*   < > 8.9   HGB  --  10.2* 9.2* 8.4*   < > 7.0*   MCV  --  90 93 92   < > 92   PLT  --  464* 424 370   < > 416    138 139 142   < > 137   POTASSIUM 3.9 3.8 5.1 4.6   < > 4.6   CHLORIDE 109 106 110* 113*   < > 107   CO2 25 25 24 22   < > 23   BUN 10 11 11 16   < > 35*   CR 1.29* 1.14 1.16 1.31*   < > 1.54*   ANIONGAP 5 7 4 7   < > 7   LU 8.7 8.6 8.5 8.0*   < > 8.4*   * 104* 111* 115*   < > 144*   ALBUMIN  --   --   --   --   --  3.2*   PROTTOTAL  --   --   --   --   --  7.0   BILITOTAL  --   --   --   --   --  0.4   ALKPHOS  --   --   --   --   --  86   ALT  --   --   --   --   --  20   AST  --   --   --   --   --  17   LIPASE  --   --   --   --   --  195   TROPI  --   --   --   --   --  <0.015    < > = values in this interval not displayed.       No results found for this or any previous visit (from the past 24 hour(s)).

## 2019-05-22 NOTE — PLAN OF CARE
POD#5 ex lap  +sm, loose BM tonight  +BS audible x 4, incision drsg CD&I  Denies pain, N/V  Zosyn IV, D5 1/2 NS 50 ml/hr  JOANNA drain 15 ml yellow output  , 146  Full liquid diet tolerating well  Ambulating independently, encouraged ambulating in hallways  Discharge pending, f/up EGD in 2 mos per GI

## 2019-05-29 NOTE — DISCHARGE SUMMARY
[facility]    Discharge Summary  Surgery    Date of Admission:  5/15/2019  Date of Discharge:  5/22/2019  5:40 PM  Discharging Provider: Milagros Zavala PA-C and Mindy Vora MD  Discharge Summary Note completed by: Kirsten Ortega PA-C on 5/29/2019  Date of Service: The patient was personally seen by Discharging Providers on the day of discharge.    Discharge Diagnoses   Active Problems:    Acute perforated gastric ulcer with hemorrhage (H)      Procedure/Surgery Information   Procedure(s):  Exploratory laparotomy, oversew and patching of perforated gastric ulcer.   Surgeon(s) and Role:     * Mat Castro MD - Primary     * Raad Marcano PA-C - Assisting     Specimens: * No specimens in log *   Non-operative procedures: NGT placement by Radiology       History of Present Illness   Manny Manuel is a 59 year old male who presented with a 15-day history of upper abdominal pain.  This became more severe today and he presented to the emergency room.  He had previously been seen in his primary care clinic and was undergoing work-up for possible gallbladder issues.  Ultrasound in the emergency room revealed no gallbladder abnormalities.  CT scan was therefore performed and this showed free air in the abdomen.         Hospital Course   Manny Manuel was admitted on 5/15/2019.  The following problems were addressed during his hospitalization:  Patient Active Problem List   Diagnosis     Acute perforated gastric ulcer with hemorrhage (H)       Yasemin-operative antibiotic therapy included: Zosyn.  Post-operative pain control: was via IV until able to tolerate PO intake and transitioned to PO pain meds.    Remarkable hospital course events: Eugene drain was placed intraoperatively and was removed prior to discharge.  Patient experienced postop ileus as expected.  Gastroenterology was consulted for gastric ulcer follow up recommendations.  It was felt that patient's gastric ulcer was most  likely caused by naproxen use to treat knee pain.  [facility] will contact patient to arrange follow up EGD in 2 months.  Hospitalist was consulted for medical management. Please see Hospitalist notes for further details if needed.    Manny met all criteria for release on 5/22/2019  5:40 PM.  He was afebrile, tolerating diet, pain controlled on PO meds, ambulating well, and had return of bowel function.    Medications discontinued or adjusted during this hospitalization: see discharge med list below.    Antibiotics prescribed at discharge: None prescribed     Imaging study follow up needs:   -No studies require specific follow-up    Discharge Instructions and Follow-Up:  Discharge diet: Regular   Discharge activity: Lifting restricted to 20 pounds   Discharge follow-up: Follow up with Dr. Castro in 2-3 weeks   Wound/Incision care: May get incision wet in shower but do not soak or scrub  Ninoska out in 8 days       Kirsten Ortega PA-C      Discharge Disposition   Discharged to home   Condition at discharge: Good    Pending Results   Final pathology results: N/A  Unresulted Labs Ordered in the Past 30 Days of this Admission     No orders found from 3/16/2019 to 5/16/2019.          Primary Care Physician   [facility]    Consultations This Hospital Stay   HOSPITALIST IP CONSULT  NUTRITION SERVICES ADULT IP CONSULT  GASTROENTEROLOGY IP CONSULT    Discharge Orders      Follow-up and recommended labs and tests     Follow up with primary care provider, [facility], within 7 days to evaluate medication change and for hospital follow- up.  The following labs/tests are recommended: CBC and BMP. Resumed lisinopril if renal function at baseline.  Patient to have outpatient GI referral for follow-up EGD in 2 months. H. Pylori testing can be done at that time as needed.     Discharge Medications   Discharge Medication List as of 5/22/2019  3:41 PM      START taking these medications     Details   acetaminophen (TYLENOL) 325 MG tablet Take 2 tablets (650 mg) by mouth every 6 hours for 10 days Taper dosing as pain decreases., Disp-80 tablet, R-0, E-Prescribe      amoxicillin-clavulanate (AUGMENTIN) 875-125 MG tablet Take 1 tablet by mouth 2 times daily for 5 days, Disp-10 tablet, R-0, E-Prescribe      oxyCODONE (ROXICODONE) 5 MG tablet Take 1 tablet (5 mg) by mouth every 4 hours as needed for moderate to severe pain, Disp-15 tablet, R-0, Local Print      pantoprazole (PROTONIX) 40 MG EC tablet Take 1 tablet (40 mg) by mouth 2 times daily (before meals), Disp-30 tablet, R-1, E-Prescribe         CONTINUE these medications which have NOT CHANGED    Details   amLODIPine (NORVASC) 5 MG tablet Take 5 mg by mouth daily, Historical      metFORMIN (GLUCOPHAGE-XR) 500 MG 24 hr tablet Take 500 mg by mouth daily, Historical         STOP taking these medications       lisinopril (PRINIVIL/ZESTRIL) 40 MG tablet Comments:   Reason for Stopping:         naproxen sodium (ANAPROX) 220 MG tablet Comments:   Reason for Stopping:             Allergies   Allergies   Allergen Reactions     Nsaids      Perforated gastric ulcer     Data   Most Recent 3 CBC's:  Recent Labs   Lab Test 05/20/19  0706 05/19/19  0705 05/18/19  0755   WBC 9.1 13.1* 15.9*   HGB 10.2* 9.2* 8.4*   MCV 90 93 92   * 424 370      Most Recent 3 BMP's:  Recent Labs   Lab Test 05/22/19  0629 05/20/19  0706 05/19/19  0705    138 139   POTASSIUM 3.9 3.8 5.1   CHLORIDE 109 106 110*   CO2 25 25 24   BUN 10 11 11   CR 1.29* 1.14 1.16   ANIONGAP 5 7 4   LU 8.7 8.6 8.5   * 104* 111*     Most Recent 2 LFT's:  Recent Labs   Lab Test 05/15/19  2234 05/03/19   AST 17 19   ALT 20 13   ALKPHOS 86  --    BILITOTAL 0.4  --      Most Recent INR's and Anticoagulation Dosing History:  Anticoagulation Dose History     There is no flowsheet data to display.        Most Recent 3 Troponin's:  Recent Labs   Lab Test 05/15/19  2234   TROPI <0.015     Most  Recent Cholesterol Panel:No lab results found.  Most Recent 6 Bacteria Isolates From Any Culture (See EPIC Reports for Culture Details):No lab results found.  Most Recent TSH, T4 and A1c Labs:  Recent Labs   Lab Test 05/16/19  0932   A1C 5.2     Results for orders placed or performed during the hospital encounter of 05/15/19   US Abdomen Limited    Narrative    US ABDOMEN LIMITED  5/15/2019 11:36 PM      HISTORY: Right upper quadrant pain, gallstones, jaundice.    COMPARISON: None.    FINDINGS: The liver is normal in size and texture without focal mass.  There is no intra or extrahepatic biliary dilatation. The common  hepatic duct measures 0.6 cm. The gallbladder is normal in appearance  without gallstones. The pancreas is completely obscured by bowel gas.  The right kidney measures 9.6 cm. There is a 0.8 cm cyst in the upper  pole of the kidney. The proximal abdominal aorta and IVC appear  normal.      Impression    IMPRESSION: Normal right upper quadrant. No gallstone or biliary  dilatation.    SARI YA MD   CT Aortic Survey w Contrast     Value    Radiologist flags Unexplained pneumoperitoneum (AA)    Narrative    CT AORTIC SURVEY W CONTRAST  5/16/2019 12:27 AM      HISTORY: Abdominal and chest pain. Low hemoglobin.    TECHNIQUE: CT chest, abdomen and pelvis with intravenous contrast.  Radiation dose for this scan was reduced using automated exposure  control, adjustment of the mA and/or kV according to patient size, or  iterative reconstruction technique. 80 mL Isovue-370.      COMPARISON: None.    FINDINGS:  Chest: There is no thoracic aortic aneurysm or dissection. No central  pulmonary embolus. There are coronary artery atherosclerotic  calcifications. The heart is at the upper limits of normal in size.  There is no mediastinal, hilar or axillary lymph node enlargement.  There is dependent atelectasis bilaterally. 0.7 cm nodule along the  right major fissure. Scarring at the lung bases. No  pneumothorax or  pleural effusion.    Abdomen: There is atherosclerotic calcification of the abdominal aorta  and its branches. No aneurysm or dissection. The liver, spleen,  gallbladder, pancreas, adrenal glands and left kidney are normal in  appearance. Small probable cortical cyst in the upper pole of the  right kidney.    There is free intraperitoneal gas. There is evidence of gastric ulcer  and perforation in the region of the pylorus. The stomach is distended  with air and food debris. There is no abdominal or pelvic lymph node  enlargement.    Pelvis: There are colonic diverticula without acute diverticulitis.  Small and large bowel otherwise appear normal. There is a small amount  of ascites. Degenerative disease in the spine.      Impression    IMPRESSION:  1. Free intraperitoneal gas which appears to be due to a perforated  ulcer in the distal stomach.  2. No aortic aneurysm or dissection.  3. Small amount of ascites.    [Critical Result: Unexplained pneumoperitoneum]    Finding was identified on 5/16/2019 12:35 AM.     Dr. Sánchez was contacted by me on 5/16/2019 12:39 AM and verbalized  understanding of the critical result.    SARI YA MD   XR Nasal Gastric Tube Placement    Narrative    FEEDING TUBE PLACEMENT   5/16/2019 1:17 PM    HISTORY: Nutritional needs.    COMPARISON: None    FINDINGS: 1.45 minutes of fluoroscopy were utilized for placement of a  feeding tube.  At the final position, the 14 Sami nasogastric  suction tube tip was placed within the stomach.  20 mL of contrast was  injected to confirm placement.  The tube was fastened to the patient's  nose using tape. Estimated blood loss during the procedure was less  than 5 mL. No specimens collected. There were no complications of the  procedure.    Medications used: 5 mL of 2% lidocaine jelly  Images Obtained: 1      Impression    IMPRESSION: Successful NG tube placement.    TITA GARSIA PA-C   XR Upper GI Water Soluble    Narrative     UPPER GI WATER SOLUBLE May 20, 2019 8:59 AM     HISTORY: Perforated ulcer. Evaluate for leak.    COMPARISON: None.    TECHNIQUE: Single contrast technique was employed. 2.8 minutes of  fluoroscopy time was used. Fourteen spot images were obtained.     FINDINGS: Water-soluble contrast was injected through the existing  enteric tube. The stomach is unremarkable where visualized. No  convincing evidence for a contrast leak. Unremarkable duodenum and  proximal jejunum. A surgical drain is projected over the right upper  abdomen, with tip superior to the gastric body.       Impression    IMPRESSION: No convincing evidence for a contrast leak.    JOHNSON GEE MD

## 2019-06-14 ENCOUNTER — OFFICE VISIT (OUTPATIENT)
Dept: SURGERY | Facility: CLINIC | Age: 59
End: 2019-06-14
Payer: COMMERCIAL

## 2019-06-14 VITALS
BODY MASS INDEX: 29.44 KG/M2 | HEIGHT: 62 IN | OXYGEN SATURATION: 100 % | RESPIRATION RATE: 16 BRPM | SYSTOLIC BLOOD PRESSURE: 120 MMHG | WEIGHT: 160 LBS | HEART RATE: 68 BPM | DIASTOLIC BLOOD PRESSURE: 78 MMHG

## 2019-06-14 DIAGNOSIS — Z09 SURGICAL FOLLOWUP VISIT: Primary | ICD-10-CM

## 2019-06-14 PROCEDURE — 99024 POSTOP FOLLOW-UP VISIT: CPT | Performed by: SURGERY

## 2019-06-14 ASSESSMENT — MIFFLIN-ST. JEOR: SCORE: 1413.34

## 2019-06-14 NOTE — PROGRESS NOTES
The patient returns today to follow-up after his exploratory laparotomy with patching and oversewing of a perforated gastric ulcer.  He is scheduled for a follow-up endoscopy in July.  He feels well.  The patient does heavy lifting at work and needs an extension on his time off work form.    Patient's incision is well-healed.  The staples are removed today.    Overall, the patient is doing well.  I explained to him through an  that he needs to avoid all NSAIDs in the future.  It is important for him to keep taking his proton pump inhibitor as well.  He may discuss this further with the GI when he follows up for his endoscopy.  The patient may return to see me on an as-needed basis.  His restrictions on lifting continue for 6 weeks after surgery.    Mat Castro MD  [facility]    Please route or send letter to:  *None*

## 2019-08-09 ENCOUNTER — TRANSFERRED RECORDS (OUTPATIENT)
Dept: SURGERY | Facility: CLINIC | Age: 59
End: 2019-08-09

## 2020-03-11 ENCOUNTER — HEALTH MAINTENANCE LETTER (OUTPATIENT)
Age: 60
End: 2020-03-11

## 2020-12-11 ENCOUNTER — RECORDS - HEALTHEAST (OUTPATIENT)
Dept: LAB | Facility: CLINIC | Age: 60
End: 2020-12-11

## 2020-12-16 LAB
GAMMA INTERFERON BACKGROUND BLD IA-ACNC: 0.11 IU/ML
M TB IFN-G BLD-IMP: NEGATIVE
MITOGEN IGNF BCKGRD COR BLD-ACNC: -0.02 IU/ML
MITOGEN IGNF BCKGRD COR BLD-ACNC: 0 IU/ML
QTF INTERPRETATION: NORMAL
QTF MITOGEN - NIL: 9.98 IU/ML

## 2021-01-03 ENCOUNTER — HEALTH MAINTENANCE LETTER (OUTPATIENT)
Age: 61
End: 2021-01-03

## 2021-04-25 ENCOUNTER — HEALTH MAINTENANCE LETTER (OUTPATIENT)
Age: 61
End: 2021-04-25

## 2021-10-10 ENCOUNTER — HEALTH MAINTENANCE LETTER (OUTPATIENT)
Age: 61
End: 2021-10-10

## 2022-05-21 ENCOUNTER — HEALTH MAINTENANCE LETTER (OUTPATIENT)
Age: 62
End: 2022-05-21

## 2022-09-18 ENCOUNTER — HEALTH MAINTENANCE LETTER (OUTPATIENT)
Age: 62
End: 2022-09-18

## 2022-11-23 NOTE — PHARMACY-ADMISSION MEDICATION HISTORY
Admission medication history interview status for this patient is complete. See [facility] admission navigator for allergy information, prior to admission medications and immunization status.     Medication history interview source(s):Patient  Medication history resources (including written lists, pill bottles, clinic record):None  Primary pharmacy:None. Previously filled at a clinic pharmacy through [facility]. He is interested in using [facility] at discharge.     Changes made to PTA medication list:  Added: metformin, naproxen  Deleted: acetaminophen (not using, doesn't work), atorvastatin (not using)  Changed: none    Actions taken by pharmacist (provider contacted, etc):None     Additional medication history information: Patient is Sinhala speaking. Medication history completed using  service with  4632. Patient recently started on metformin. He does not test blood sugars.    Medication reconciliation/reorder completed by provider prior to medication history? No    Do you take OTC medications (eg tylenol, ibuprofen, fish oil, eye/ear drops, etc)? Y    For patients on insulin therapy: N     Prior to Admission medications    Medication Sig Last Dose Taking? Auth Provider   amLODIPine (NORVASC) 5 MG tablet Take 5 mg by mouth daily 5/15/2019 at Unknown time Yes Reported, Patient   lisinopril (PRINIVIL/ZESTRIL) 40 MG tablet Take 40 mg by mouth daily 5/15/2019 at Unknown time Yes Reported, Patient   metFORMIN (GLUCOPHAGE-XR) 500 MG 24 hr tablet Take 500 mg by mouth daily 5/15/2019 at Unknown time Yes Unknown, Entered By History   naproxen sodium (ANAPROX) 220 MG tablet Take 440 mg by mouth daily as needed for moderate pain 5/15/2019 at Unknown time Yes Unknown, Entered By History                LOV with Dr Flood 05/20/2022. Patient was referred to [facility] by PCP 05/31/2022.Patient cancelled appt with Pete 06/22/2022 and Dr Singleton 08/03/22.Has been seen by [facility] since 07/21/22. No follow ups on file with [facility].

## 2023-01-29 ENCOUNTER — HEALTH MAINTENANCE LETTER (OUTPATIENT)
Age: 63
End: 2023-01-29

## 2023-05-07 ENCOUNTER — HEALTH MAINTENANCE LETTER (OUTPATIENT)
Age: 63
End: 2023-05-07

## 2024-05-01 ENCOUNTER — HOSPITAL ENCOUNTER (EMERGENCY)
Facility: CLINIC | Age: 64
Discharge: HOME | End: 2024-05-02
Attending: EMERGENCY MEDICINE | Admitting: EMERGENCY MEDICINE
Payer: COMMERCIAL

## 2024-05-01 VITALS
OXYGEN SATURATION: 100 % | RESPIRATION RATE: 18 BRPM | HEART RATE: 78 BPM | TEMPERATURE: 98.1 F | DIASTOLIC BLOOD PRESSURE: 78 MMHG | WEIGHT: 143.08 LBS | SYSTOLIC BLOOD PRESSURE: 128 MMHG | HEIGHT: 61 IN | BODY MASS INDEX: 27.01 KG/M2

## 2024-05-01 DIAGNOSIS — M54.9 BACK PAIN, UNSPECIFIED BACK LOCATION, UNSPECIFIED BACK PAIN LATERALITY, UNSPECIFIED CHRONICITY: ICD-10-CM

## 2024-05-01 DIAGNOSIS — R31.9 HEMATURIA, UNSPECIFIED TYPE: ICD-10-CM

## 2024-05-01 LAB
ALBUMIN UR-MCNC: 30 MG/DL
ANION GAP SERPL CALCULATED.3IONS-SCNC: 12 MMOL/L (ref 7–15)
APPEARANCE UR: CLEAR
BACTERIA #/AREA URNS HPF: ABNORMAL /HPF
BASOPHILS # BLD AUTO: 0.1 10E3/UL (ref 0–0.2)
BASOPHILS NFR BLD AUTO: 1 %
BILIRUB UR QL STRIP: NEGATIVE
BUN SERPL-MCNC: 49.7 MG/DL (ref 8–23)
CALCIUM SERPL-MCNC: 8.9 MG/DL (ref 8.8–10.2)
CHLORIDE SERPL-SCNC: 102 MMOL/L (ref 98–107)
COLOR UR AUTO: ABNORMAL
CREAT SERPL-MCNC: 1.1 MG/DL (ref 0.67–1.17)
DEPRECATED HCO3 PLAS-SCNC: 18 MMOL/L (ref 22–29)
EGFRCR SERPLBLD CKD-EPI 2021: 74 ML/MIN/1.73M2
EOSINOPHIL # BLD AUTO: 0.1 10E3/UL (ref 0–0.7)
EOSINOPHIL NFR BLD AUTO: 2 %
ERYTHROCYTE [DISTWIDTH] IN BLOOD BY AUTOMATED COUNT: 13.2 % (ref 10–15)
GLUCOSE SERPL-MCNC: 179 MG/DL (ref 70–99)
GLUCOSE UR STRIP-MCNC: NEGATIVE MG/DL
HCT VFR BLD AUTO: 42.7 % (ref 40–53)
HGB BLD-MCNC: 14.3 G/DL (ref 13.3–17.7)
HGB UR QL STRIP: ABNORMAL
HOLD SPECIMEN: NORMAL
HOLD SPECIMEN: NORMAL
HYALINE CASTS: 3 /LPF
IMM GRANULOCYTES # BLD: 0 10E3/UL
IMM GRANULOCYTES NFR BLD: 0 %
KETONES UR STRIP-MCNC: NEGATIVE MG/DL
LEUKOCYTE ESTERASE UR QL STRIP: NEGATIVE
LYMPHOCYTES # BLD AUTO: 2.3 10E3/UL (ref 0.8–5.3)
LYMPHOCYTES NFR BLD AUTO: 37 %
MCH RBC QN AUTO: 30.3 PG (ref 26.5–33)
MCHC RBC AUTO-ENTMCNC: 33.5 G/DL (ref 31.5–36.5)
MCV RBC AUTO: 91 FL (ref 78–100)
MONOCYTES # BLD AUTO: 0.6 10E3/UL (ref 0–1.3)
MONOCYTES NFR BLD AUTO: 10 %
MUCOUS THREADS #/AREA URNS LPF: PRESENT /LPF
NEUTROPHILS # BLD AUTO: 3.2 10E3/UL (ref 1.6–8.3)
NEUTROPHILS NFR BLD AUTO: 50 %
NITRATE UR QL: NEGATIVE
NRBC # BLD AUTO: 0 10E3/UL
NRBC BLD AUTO-RTO: 0 /100
PH UR STRIP: 5 [PH] (ref 5–7)
PLATELET # BLD AUTO: 250 10E3/UL (ref 150–450)
POTASSIUM SERPL-SCNC: 4.6 MMOL/L (ref 3.4–5.3)
RBC # BLD AUTO: 4.72 10E6/UL (ref 4.4–5.9)
RBC URINE: 4 /HPF
SODIUM SERPL-SCNC: 132 MMOL/L (ref 135–145)
SP GR UR STRIP: 1.01 (ref 1–1.03)
TRI-PHOS CRY #/AREA URNS HPF: ABNORMAL /HPF
UROBILINOGEN UR STRIP-MCNC: NORMAL MG/DL
WBC # BLD AUTO: 6.2 10E3/UL (ref 4–11)
WBC URINE: 1 /HPF

## 2024-05-01 PROCEDURE — 99284 EMERGENCY DEPT VISIT MOD MDM: CPT | Mod: 25

## 2024-05-01 PROCEDURE — 81001 URINALYSIS AUTO W/SCOPE: CPT | Performed by: EMERGENCY MEDICINE

## 2024-05-01 PROCEDURE — 85025 COMPLETE CBC W/AUTO DIFF WBC: CPT | Performed by: EMERGENCY MEDICINE

## 2024-05-01 PROCEDURE — 36415 COLL VENOUS BLD VENIPUNCTURE: CPT | Performed by: EMERGENCY MEDICINE

## 2024-05-01 PROCEDURE — 80048 BASIC METABOLIC PNL TOTAL CA: CPT | Performed by: EMERGENCY MEDICINE

## 2024-05-01 ASSESSMENT — COLUMBIA-SUICIDE SEVERITY RATING SCALE - C-SSRS
2. HAVE YOU ACTUALLY HAD ANY THOUGHTS OF KILLING YOURSELF IN THE PAST MONTH?: NO
1. IN THE PAST MONTH, HAVE YOU WISHED YOU WERE DEAD OR WISHED YOU COULD GO TO SLEEP AND NOT WAKE UP?: NO
6. HAVE YOU EVER DONE ANYTHING, STARTED TO DO ANYTHING, OR PREPARED TO DO ANYTHING TO END YOUR LIFE?: NO

## 2024-05-01 ASSESSMENT — ACTIVITIES OF DAILY LIVING (ADL): ADLS_ACUITY_SCORE: 36

## 2024-05-02 ENCOUNTER — APPOINTMENT (OUTPATIENT)
Dept: CT IMAGING | Facility: CLINIC | Age: 64
End: 2024-05-02
Attending: EMERGENCY MEDICINE
Payer: COMMERCIAL

## 2024-05-02 PROCEDURE — 74176 CT ABD & PELVIS W/O CONTRAST: CPT

## 2024-05-02 PROCEDURE — 250N000013 HC RX MED GY IP 250 OP 250 PS 637: Performed by: EMERGENCY MEDICINE

## 2024-05-02 PROCEDURE — 72125 CT NECK SPINE W/O DYE: CPT

## 2024-05-02 RX ORDER — METHOCARBAMOL 750 MG/1
750 TABLET, FILM COATED ORAL ONCE
Status: COMPLETED | OUTPATIENT
Start: 2024-05-02 | End: 2024-05-02

## 2024-05-02 RX ORDER — METHOCARBAMOL 750 MG/1
750 TABLET, FILM COATED ORAL 3 TIMES DAILY
Qty: 28 TABLET | Refills: 0 | Status: SHIPPED | OUTPATIENT
Start: 2024-05-02

## 2024-05-02 RX ADMIN — METHOCARBAMOL 750 MG: 750 TABLET ORAL at 00:19

## 2024-05-02 ASSESSMENT — ACTIVITIES OF DAILY LIVING (ADL): ADLS_ACUITY_SCORE: 38

## 2024-05-02 NOTE — DISCHARGE INSTRUCTIONS
Take the muscle relaxer as directed.    Your urine showed small mount of blood.  Please follow-up with urology regarding further evaluation of this.    Follow-up with your primary care doctor in 1 to 2 days    Return to the ER for any worsening or concerning symptoms

## 2024-05-02 NOTE — ED PROVIDER NOTES
"  History     Chief Complaint:  Flank Pain     Patient is Bulgarian-speaking family at the bedside is providing interpretation.  VITA Manuel is a 64 year old male with history of diabetes and hypertension presenting today with back pain.  He points to the left flank as area of pain.  Reports no injury or heavy lifting.  He has had symptoms for approximately week.  States that he has had no pain or difficulty with urination.  No recent fevers.  States he also has pain going from his neck down to his back.  States that the pain sometimes does worsen with movement.  No numbness or tingling.  Patient able to ambulate without any difficulty.      Independent Historian:   None - Patient Only    Review of External Notes:   Last seen in the office for type 2 diabetes, hypertension.      Medications:    methocarbamol (ROBAXIN) 750 MG tablet  amLODIPine (NORVASC) 5 MG tablet  metFORMIN (GLUCOPHAGE-XR) 500 MG 24 hr tablet  oxyCODONE (ROXICODONE) 5 MG tablet        Past Medical History:    Past Medical History:   Diagnosis Date    Diabetes (H)     Hypertension        Past Surgical History:    Past Surgical History:   Procedure Laterality Date    LAPAROTOMY EXPLORATORY N/A 5/16/2019    Procedure: Exploratory laparotomy, oversew and patching of perforated gastric ulcer.;  Surgeon: Mat Castro MD;  Location: [facility] OR        Physical Exam   Patient Vitals for the past 24 hrs:   BP Temp Temp src Pulse Resp SpO2 Height Weight   05/01/24 2100 128/78 98.1  F (36.7  C) Oral 78 18 100 % 1.54 m (5' 0.63\") 64.9 kg (143 lb 1.3 oz)        Physical Exam  Vitals reviewed.   Constitutional:       General: He is not in acute distress.     Appearance: He is not ill-appearing.   HENT:      Head: Normocephalic and atraumatic.   Eyes:      Extraocular Movements: Extraocular movements intact.   Cardiovascular:      Rate and Rhythm: Normal rate and regular rhythm.   Pulmonary:      Effort: Pulmonary effort is normal. No respiratory distress. "      Breath sounds: Normal breath sounds. No wheezing.   Abdominal:      Palpations: Abdomen is soft.      Tenderness: There is no abdominal tenderness. There is no right CVA tenderness, left CVA tenderness or guarding.   Musculoskeletal:      Cervical back: Normal range of motion.      Comments: No midline cervical, thoracic, lumbar spine tenderness or step-offs.  Patient moving bilateral upper and lower extremities without any difficulty.  He is ambulatory without any problem.   Skin:     General: Skin is warm and dry.   Neurological:      Mental Status: He is alert and oriented to person, place, and time.      GCS: GCS eye subscore is 4. GCS verbal subscore is 5. GCS motor subscore is 6.   Psychiatric:         Behavior: Behavior normal.             Emergency Department Course     Imaging:  Abd/pelvis CT no contrast - Stone Protocol   Final Result   IMPRESSION:    1.  No urinary calculi or hydronephrosis.      2.  No appendicitis.         CT Cervical Spine w/o Contrast   Final Result   IMPRESSION:   1. Good anatomic alignment and vertebral body heights maintained.   2. No evidence of acute cervical spine fracture.   3. No significant canal compromise or neural foraminal narrowing throughout cervical spine.             Laboratory:  Labs Ordered and Resulted from Time of ED Arrival to Time of ED Departure   ROUTINE UA WITH MICROSCOPIC REFLEX TO CULTURE - Abnormal       Result Value    Color Urine Light Yellow      Appearance Urine Clear      Glucose Urine Negative      Bilirubin Urine Negative      Ketones Urine Negative      Specific Gravity Urine 1.015      Blood Urine Small (*)     pH Urine 5.0      Protein Albumin Urine 30 (*)     Urobilinogen Urine Normal      Nitrite Urine Negative      Leukocyte Esterase Urine Negative      Bacteria Urine Few (*)     Mucus Urine Present (*)     Triple Phosphate Crystals Urine Few (*)     RBC Urine 4 (*)     WBC Urine 1      Hyaline Casts Urine 3 (*)    BASIC METABOLIC PANEL -  Abnormal    Sodium 132 (*)     Potassium 4.6      Chloride 102      Carbon Dioxide (CO2) 18 (*)     Anion Gap 12      Urea Nitrogen 49.7 (*)     Creatinine 1.10      GFR Estimate 74      Calcium 8.9      Glucose 179 (*)    CBC WITH PLATELETS AND DIFFERENTIAL    WBC Count 6.2      RBC Count 4.72      Hemoglobin 14.3      Hematocrit 42.7      MCV 91      MCH 30.3      MCHC 33.5      RDW 13.2      Platelet Count 250      % Neutrophils 50      % Lymphocytes 37      % Monocytes 10      % Eosinophils 2      % Basophils 1      % Immature Granulocytes 0      NRBCs per 100 WBC 0      Absolute Neutrophils 3.2      Absolute Lymphocytes 2.3      Absolute Monocytes 0.6      Absolute Eosinophils 0.1      Absolute Basophils 0.1      Absolute Immature Granulocytes 0.0      Absolute NRBCs 0.0          Procedures       Emergency Department Course & Assessments:    Interventions:  Medications   methocarbamol (ROBAXIN) tablet 750 mg (750 mg Oral $Given 24 0019)               Social Determinants of Health affecting care:   None    Disposition:  The patient was discharged.     Impression & Plan           Medical Decision Makin-year-old male presenting today with back pain.  Patient presents with family.  He is primarily Yakut-speaking families providing history.  He reports left-sided flank pain as well as generalized back pain from the cervical spine down.  No midline cervical, thoracic, lumbar spine tenderness.  Patient ambulatory without any difficulty.  He is equal and symmetric movement of the bilateral upper and lower extremities.  He is noted to have hematuria in his urine.  CT stone study as well as CT cervical spine ordered.  Imaging studies as noted above.  He was given Robaxin while in the ER.  He had significant improvement of his symptoms.  Resting comfortably after reevaluation.  I discussed results at length with the family.  Discussed hematuria and plan for referral to urology.  Discussed with him care  instructions return precautions.  He was sent home with Robaxin.      Diagnosis:    ICD-10-CM    1. Back pain, unspecified back location, unspecified back pain laterality, unspecified chronicity  M54.9       2. Hematuria, unspecified type  R31.9 Adult Urology  Referral           Discharge Medications:  Discharge Medication List as of 5/2/2024  1:42 AM        START taking these medications    Details   methocarbamol (ROBAXIN) 750 MG tablet Take 1 tablet (750 mg) by mouth 3 times daily, Disp-28 tablet, R-0, Local Print                Odilia Causey DO  5/2/2024   Odilia Causey DO Doan, Tiffani, DO  05/02/24 0241

## 2024-05-02 NOTE — ED TRIAGE NOTES
Patient reports 5 days of left flank pain. Patient denies injury and states the pain wraps around to his back. Patient denies urinary issues.      Triage Assessment (Adult)       Row Name 05/01/24 7935          Triage Assessment    Airway WDL WDL        Respiratory WDL    Respiratory WDL WDL        Skin Circulation/Temperature WDL    Skin Circulation/Temperature WDL WDL        Cardiac WDL    Cardiac WDL WDL        Peripheral/Neurovascular WDL    Peripheral Neurovascular WDL WDL

## 2024-07-14 ENCOUNTER — HEALTH MAINTENANCE LETTER (OUTPATIENT)
Age: 64
End: 2024-07-14

## 2024-11-03 ENCOUNTER — HOSPITAL ENCOUNTER (EMERGENCY)
Facility: CLINIC | Age: 64
Discharge: HOME | End: 2024-11-03
Attending: EMERGENCY MEDICINE | Admitting: EMERGENCY MEDICINE
Payer: COMMERCIAL

## 2024-11-03 VITALS
SYSTOLIC BLOOD PRESSURE: 143 MMHG | HEART RATE: 65 BPM | TEMPERATURE: 97.5 F | OXYGEN SATURATION: 97 % | RESPIRATION RATE: 18 BRPM | DIASTOLIC BLOOD PRESSURE: 85 MMHG

## 2024-11-03 DIAGNOSIS — G51.0 BELL'S PALSY: ICD-10-CM

## 2024-11-03 DIAGNOSIS — E11.65 UNCONTROLLED TYPE 2 DIABETES MELLITUS WITH HYPERGLYCEMIA (H): ICD-10-CM

## 2024-11-03 LAB
ALBUMIN SERPL BCG-MCNC: 4.3 G/DL (ref 3.5–5.2)
ALP SERPL-CCNC: 157 U/L (ref 40–150)
ALT SERPL W P-5'-P-CCNC: 44 U/L (ref 0–70)
ANION GAP SERPL CALCULATED.3IONS-SCNC: 15 MMOL/L (ref 7–15)
AST SERPL W P-5'-P-CCNC: 37 U/L (ref 0–45)
BASOPHILS # BLD AUTO: 0.1 10E3/UL (ref 0–0.2)
BASOPHILS NFR BLD AUTO: 1 %
BILIRUB SERPL-MCNC: 0.3 MG/DL
BUN SERPL-MCNC: 29.4 MG/DL (ref 8–23)
CALCIUM SERPL-MCNC: 9.6 MG/DL (ref 8.8–10.4)
CHLORIDE SERPL-SCNC: 99 MMOL/L (ref 98–107)
CREAT SERPL-MCNC: 1.15 MG/DL (ref 0.67–1.17)
EGFRCR SERPLBLD CKD-EPI 2021: 70 ML/MIN/1.73M2
EOSINOPHIL # BLD AUTO: 0.3 10E3/UL (ref 0–0.7)
EOSINOPHIL NFR BLD AUTO: 3 %
ERYTHROCYTE [DISTWIDTH] IN BLOOD BY AUTOMATED COUNT: 13.1 % (ref 10–15)
GLUCOSE SERPL-MCNC: 212 MG/DL (ref 70–99)
HCO3 SERPL-SCNC: 20 MMOL/L (ref 22–29)
HCT VFR BLD AUTO: 44.8 % (ref 40–53)
HGB BLD-MCNC: 15 G/DL (ref 13.3–17.7)
HOLD SPECIMEN: NORMAL
HOLD SPECIMEN: NORMAL
IMM GRANULOCYTES # BLD: 0.1 10E3/UL
IMM GRANULOCYTES NFR BLD: 1 %
LYMPHOCYTES # BLD AUTO: 2.5 10E3/UL (ref 0.8–5.3)
LYMPHOCYTES NFR BLD AUTO: 28 %
MCH RBC QN AUTO: 29.1 PG (ref 26.5–33)
MCHC RBC AUTO-ENTMCNC: 33.5 G/DL (ref 31.5–36.5)
MCV RBC AUTO: 87 FL (ref 78–100)
MONOCYTES # BLD AUTO: 0.7 10E3/UL (ref 0–1.3)
MONOCYTES NFR BLD AUTO: 8 %
NEUTROPHILS # BLD AUTO: 5.2 10E3/UL (ref 1.6–8.3)
NEUTROPHILS NFR BLD AUTO: 59 %
NRBC # BLD AUTO: 0 10E3/UL
NRBC BLD AUTO-RTO: 0 /100
PLATELET # BLD AUTO: 268 10E3/UL (ref 150–450)
POTASSIUM SERPL-SCNC: 4.6 MMOL/L (ref 3.4–5.3)
PROT SERPL-MCNC: 7.4 G/DL (ref 6.4–8.3)
RBC # BLD AUTO: 5.16 10E6/UL (ref 4.4–5.9)
SODIUM SERPL-SCNC: 134 MMOL/L (ref 135–145)
WBC # BLD AUTO: 8.8 10E3/UL (ref 4–11)

## 2024-11-03 PROCEDURE — 250N000009 HC RX 250: Performed by: EMERGENCY MEDICINE

## 2024-11-03 PROCEDURE — 82947 ASSAY GLUCOSE BLOOD QUANT: CPT | Performed by: EMERGENCY MEDICINE

## 2024-11-03 PROCEDURE — 250N000012 HC RX MED GY IP 250 OP 636 PS 637: Performed by: EMERGENCY MEDICINE

## 2024-11-03 PROCEDURE — 36415 COLL VENOUS BLD VENIPUNCTURE: CPT | Performed by: EMERGENCY MEDICINE

## 2024-11-03 PROCEDURE — 85025 COMPLETE CBC W/AUTO DIFF WBC: CPT | Performed by: EMERGENCY MEDICINE

## 2024-11-03 PROCEDURE — 99284 EMERGENCY DEPT VISIT MOD MDM: CPT

## 2024-11-03 RX ORDER — PREDNISONE 20 MG/1
40 TABLET ORAL ONCE
Status: COMPLETED | OUTPATIENT
Start: 2024-11-03 | End: 2024-11-03

## 2024-11-03 RX ORDER — PREDNISONE 20 MG/1
TABLET ORAL
Qty: 10 TABLET | Refills: 0 | Status: SHIPPED | OUTPATIENT
Start: 2024-11-03

## 2024-11-03 RX ORDER — TETRACAINE HYDROCHLORIDE 5 MG/ML
2 SOLUTION OPHTHALMIC ONCE
Status: COMPLETED | OUTPATIENT
Start: 2024-11-03 | End: 2024-11-03

## 2024-11-03 RX ADMIN — TETRACAINE HYDROCHLORIDE 2 DROP: 5 SOLUTION OPHTHALMIC at 20:28

## 2024-11-03 RX ADMIN — PREDNISONE 40 MG: 20 TABLET ORAL at 20:19

## 2024-11-03 RX ADMIN — FLUORESCEIN SODIUM 1 STRIP: 1 STRIP OPHTHALMIC at 20:27

## 2024-11-03 ASSESSMENT — ACTIVITIES OF DAILY LIVING (ADL)
ADLS_ACUITY_SCORE: 0

## 2024-11-03 ASSESSMENT — COLUMBIA-SUICIDE SEVERITY RATING SCALE - C-SSRS
1. IN THE PAST MONTH, HAVE YOU WISHED YOU WERE DEAD OR WISHED YOU COULD GO TO SLEEP AND NOT WAKE UP?: NO
6. HAVE YOU EVER DONE ANYTHING, STARTED TO DO ANYTHING, OR PREPARED TO DO ANYTHING TO END YOUR LIFE?: NO
2. HAVE YOU ACTUALLY HAD ANY THOUGHTS OF KILLING YOURSELF IN THE PAST MONTH?: NO

## 2024-11-03 NOTE — ED TRIAGE NOTES
Arrives from home with multiple family members. States has been feeling ill.     Also states concerns for his right side of his mouth which is drooping, since yesterday.     Vision issues and watery eyes.     Due to drooping side of the face, stroke EVAL called in triage.     Denies weakness.     Suspicious for bells palsy.

## 2024-11-04 NOTE — ED PROVIDER NOTES
"  Emergency Department Note      History of Present Illness     Chief Complaint   Facial Droop and Hypertension      HPI   Manny Sanchez is a 64 year old male with a history of hypertension and type II diabetes mellitus presents to the emergency department for evaluation of facial droop. The patient reports that since approximately 12 pm yesterday he has had right sided jaw tightness along with watery eyes. He reports that he had a similar problem while in [place] one year ago but it was more severe at the time. He notes that the provider told him he had \"facial nerve damage\" during that time. He denies any loss of sensation in the right side of his mouth, changes in hearing in his right ear, numbness or tingling elsewhere, or any trauma to the face.  Patient reports that he was placed on insulin for a very short time in [place], and does not use insulin daily.    Independent Historian   None     was used for the interaction.    Review of External Notes   Reviewed patient's office visit on 2/16/2024 with primary care, patient was seen for uncontrolled diabetes.  He is supposed to be taking at 1000 mg of metformin twice daily.  He is also supposed to be on insulin, long-acting as well as short acting.    Past Medical History     Medical History and Problem List   Type II Diabetes Mellitus  Hypertension  Acute perforated gastric ulcer with hemorrhage    Medications   Norvasc  Glucophage  Robaxin  Roxicodone  Lipitor  Farxiga  Januvia  Insulin glargine  Lisinopril  Protonix    Surgical History   Laparotomy exploratory    Physical Exam     Patient Vitals for the past 24 hrs:   BP Temp Temp src Pulse Resp SpO2   11/03/24 1939 -- -- -- -- -- 97 %   11/03/24 1924 -- -- -- -- -- 98 %   11/03/24 1923 -- -- -- -- -- 99 %   11/03/24 1915 (!) 143/85 -- -- 65 -- 98 %   11/03/24 1908 -- -- -- -- -- 98 %   11/03/24 1853 -- -- -- -- -- 97 %   11/03/24 1830 (!) 146/77 -- -- 68 -- 98 %   11/03/24 1805 -- -- -- " 73 -- 100 %   11/03/24 1800 (!) 144/88 -- -- -- -- --   11/03/24 1735 (!) 176/87 97.5  F (36.4  C) Temporal 82 18 99 %     Physical Exam  Gen: No distress.  Nontoxic.  Head: Atraumatic.  No hematomas, lesions, abrasions  Neck: No midline tenderness of the spine.  Mouth: No oral lesions, or abrasions.  Mucous membranes are moist.  Resp: Equal breath sounds, normal respiratory effort  Cardio: Regular rate and rhythm, no murmurs  Abdomen: Soft, nontender, nondistended  MSK; no extremity swelling, bruising, or abrasions.  Neuro: Right sided facial weakness including the forehead.  No sensation changes.  Ear canals are patent, no vesicles or rashes.  5 out of 5 muscle strength in the upper and lower extremities.  Sensation intact in the upper and lower extremities.  Finger-to-nose negative.  Heel-to-shin negative.  No truncal ataxia.    Neck: No lymphadenopathy.  Eyes General: Vision grossly intact. No visual field deficit. Extraocular movements intact. Pupil are equal and reactive. No periorbital swelling or ecchymosis. No foreign bodies visualized.   Right eye: No discharge. No conjunctival injection. No corneal abrasion or fluorescein uptake.  Left eye: No discharge. No conjunctival injection.       Diagnostics     Lab Results   Labs Ordered and Resulted from Time of ED Arrival to Time of ED Departure   COMPREHENSIVE METABOLIC PANEL - Abnormal       Result Value    Sodium 134 (*)     Potassium 4.6      Carbon Dioxide (CO2) 20 (*)     Anion Gap 15      Urea Nitrogen 29.4 (*)     Creatinine 1.15      GFR Estimate 70      Calcium 9.6      Chloride 99      Glucose 212 (*)     Alkaline Phosphatase 157 (*)     AST 37      ALT 44      Protein Total 7.4      Albumin 4.3      Bilirubin Total 0.3     CBC WITH PLATELETS AND DIFFERENTIAL    WBC Count 8.8      RBC Count 5.16      Hemoglobin 15.0      Hematocrit 44.8      MCV 87      MCH 29.1      MCHC 33.5      RDW 13.1      Platelet Count 268      % Neutrophils 59      %  Lymphocytes 28      % Monocytes 8      % Eosinophils 3      % Basophils 1      % Immature Granulocytes 1      NRBCs per 100 WBC 0      Absolute Neutrophils 5.2      Absolute Lymphocytes 2.5      Absolute Monocytes 0.7      Absolute Eosinophils 0.3      Absolute Basophils 0.1      Absolute Immature Granulocytes 0.1      Absolute NRBCs 0.0         Imaging   No orders to display       EKG   None    Independent Interpretation   None    ED Course      Medications Administered   Medications   tetracaine (PONTOCAINE) 0.5 % ophthalmic solution 2 drop (2 drops Right Eye $Given by Other 11/3/24 2028)   fluorescein (FUL-LUZ ELENA) ophthalmic strip 1 strip (1 strip Right Eye $Given by Other 11/3/24 2027)   predniSONE (DELTASONE) tablet 40 mg (40 mg Oral $Given 11/3/24 2019)       Procedures   Procedures     Discussion of Management   None    ED Course   ED Course as of 11/03/24 2206   Sun Nov 03, 2024   1805 I initially assessed the patient and obtained the above history and physical exam.     2000 I rechecked with the patient and performed an eye exam.       Additional Documentation  None    Medical Decision Making / Diagnosis     CMS Diagnoses: None    MIPS       None    [facility]   Manny Sanchez is a 64 year old male presents emergency department with a complaint of right sided facial droop.  Patient reports this started over 24 hours ago.  He noticed it when he was trying to drink water, and the water spilled out of the right side of his mouth.  He also has noticed that his eye is watering.  No changes in his hearing or taste.  On exam patient does have asymmetry of the face including the forehead.  Patient can close his eye tight, but the muscles are weak when compared to the left and I am able to pull open his eyelids.  No rashes, vesicles.  No signs of shingles.  No swelling of the face.  No sensation changes of the face.  No redness of the eye.  On eye exam there is no signs of any corneal abrasions or ulceration.  No other  focal neurologic deficits.  I do think that the patient has Bell's palsy.  I do not think that he is having a stroke.  Patient is advised to use lubricating eyedrops during the day.  He is advised to tape his eye shut at night.  This is a difficult situation, as the patient has uncontrolled diabetes.  He is taking his metformin but does not take his insulin regularly.  I did advise him that he needs to check his blood sugar twice a day.  I am going to place him on prednisone and he is going to call his primary care physician tomorrow to get started on his insulin.  He is advised if he starts to have shortness of breath, nausea and vomiting, and his blood sugars are elevated to return to the emergency department.  Patient agrees with this plan.  If he starts to have any eye pain or problems he is going to follow-up with his eye doctor.  Patient is discharged home.    Disposition   The patient was discharged.     Diagnosis     ICD-10-CM    1. Bell's palsy  G51.0       2. Uncontrolled type 2 diabetes mellitus with hyperglycemia (H)  E11.65            Discharge Medications   Discharge Medication List as of 11/3/2024  8:21 PM        START taking these medications    Details   metFORMIN (GLUCOPHAGE) 500 MG tablet Take 2 tablets (1,000 mg) by mouth 2 times daily (with meals) for 5 days., Disp-20 tablet, R-0, E-Prescribe      predniSONE (DELTASONE) 20 MG tablet Take two tablets (= 40mg) each day for 5 (five) days, Disp-10 tablet, R-0, E-Prescribe               Scribe Disclosure:  I, Werner Garrison, am serving as a scribe at 6:01 PM on 11/3/2024 to document services personally performed by Kamala Baer MD based on my observations and the provider's statements to me.        Kamala Baer MD  11/03/24 8994

## 2024-11-04 NOTE — DISCHARGE INSTRUCTIONS
Please check your blood sugars twice a day. If you have elevated blood sugars, you are short of breath, having nausea or vomiting. Please drink plenty of fluids and come back to the Emergency Department.    You are supposed to be on insulin. Please call your doctor on Monday to restart your medication.    Please take prednisone for the next 5 days.  Please use lubricating eye drops during the day. Tape your eye shut during the night.    Please go to your appointment on Friday with your primary care provider.

## 2024-11-16 ENCOUNTER — HOSPITAL ENCOUNTER (EMERGENCY)
Facility: CLINIC | Age: 64
Discharge: HOME | End: 2024-11-16
Attending: EMERGENCY MEDICINE | Admitting: EMERGENCY MEDICINE
Payer: COMMERCIAL

## 2024-11-16 ENCOUNTER — APPOINTMENT (OUTPATIENT)
Dept: CT IMAGING | Facility: CLINIC | Age: 64
End: 2024-11-16
Attending: EMERGENCY MEDICINE
Payer: COMMERCIAL

## 2024-11-16 VITALS
RESPIRATION RATE: 18 BRPM | DIASTOLIC BLOOD PRESSURE: 73 MMHG | TEMPERATURE: 97.5 F | HEART RATE: 70 BPM | SYSTOLIC BLOOD PRESSURE: 119 MMHG | BODY MASS INDEX: 29.73 KG/M2 | WEIGHT: 155.42 LBS | OXYGEN SATURATION: 98 %

## 2024-11-16 DIAGNOSIS — K29.00 ACUTE GASTRITIS WITHOUT HEMORRHAGE, UNSPECIFIED GASTRITIS TYPE: ICD-10-CM

## 2024-11-16 DIAGNOSIS — R73.9 HYPERGLYCEMIA: ICD-10-CM

## 2024-11-16 DIAGNOSIS — K25.2 ACUTE PERFORATED GASTRIC ULCER WITH HEMORRHAGE (H): ICD-10-CM

## 2024-11-16 LAB
ALBUMIN SERPL BCG-MCNC: 4.3 G/DL (ref 3.5–5.2)
ALP SERPL-CCNC: 148 U/L (ref 40–150)
ALT SERPL W P-5'-P-CCNC: 25 U/L (ref 0–70)
ANION GAP SERPL CALCULATED.3IONS-SCNC: 12 MMOL/L (ref 7–15)
AST SERPL W P-5'-P-CCNC: 22 U/L (ref 0–45)
BASOPHILS # BLD AUTO: 0.1 10E3/UL (ref 0–0.2)
BASOPHILS NFR BLD AUTO: 1 %
BILIRUB SERPL-MCNC: 0.3 MG/DL
BUN SERPL-MCNC: 25.1 MG/DL (ref 8–23)
CALCIUM SERPL-MCNC: 9.5 MG/DL (ref 8.8–10.4)
CHLORIDE SERPL-SCNC: 94 MMOL/L (ref 98–107)
CREAT SERPL-MCNC: 1.17 MG/DL (ref 0.67–1.17)
EGFRCR SERPLBLD CKD-EPI 2021: 69 ML/MIN/1.73M2
EOSINOPHIL # BLD AUTO: 0.2 10E3/UL (ref 0–0.7)
EOSINOPHIL NFR BLD AUTO: 1 %
ERYTHROCYTE [DISTWIDTH] IN BLOOD BY AUTOMATED COUNT: 12.5 % (ref 10–15)
GLUCOSE BLDC GLUCOMTR-MCNC: 386 MG/DL (ref 70–99)
GLUCOSE SERPL-MCNC: 341 MG/DL (ref 70–99)
HCO3 SERPL-SCNC: 23 MMOL/L (ref 22–29)
HCT VFR BLD AUTO: 43.4 % (ref 40–53)
HGB BLD-MCNC: 14.7 G/DL (ref 13.3–17.7)
IMM GRANULOCYTES # BLD: 0.1 10E3/UL
IMM GRANULOCYTES NFR BLD: 0 %
LIPASE SERPL-CCNC: 51 U/L (ref 13–60)
LYMPHOCYTES # BLD AUTO: 2.3 10E3/UL (ref 0.8–5.3)
LYMPHOCYTES NFR BLD AUTO: 14 %
MCH RBC QN AUTO: 29.7 PG (ref 26.5–33)
MCHC RBC AUTO-ENTMCNC: 33.9 G/DL (ref 31.5–36.5)
MCV RBC AUTO: 88 FL (ref 78–100)
MONOCYTES # BLD AUTO: 0.9 10E3/UL (ref 0–1.3)
MONOCYTES NFR BLD AUTO: 6 %
NEUTROPHILS # BLD AUTO: 13.2 10E3/UL (ref 1.6–8.3)
NEUTROPHILS NFR BLD AUTO: 79 %
NRBC # BLD AUTO: 0 10E3/UL
NRBC BLD AUTO-RTO: 0 /100
PLATELET # BLD AUTO: 240 10E3/UL (ref 150–450)
POTASSIUM SERPL-SCNC: 4.8 MMOL/L (ref 3.4–5.3)
PROT SERPL-MCNC: 7.6 G/DL (ref 6.4–8.3)
RBC # BLD AUTO: 4.95 10E6/UL (ref 4.4–5.9)
SODIUM SERPL-SCNC: 129 MMOL/L (ref 135–145)
WBC # BLD AUTO: 16.8 10E3/UL (ref 4–11)

## 2024-11-16 PROCEDURE — 96374 THER/PROPH/DIAG INJ IV PUSH: CPT | Mod: 59

## 2024-11-16 PROCEDURE — 82040 ASSAY OF SERUM ALBUMIN: CPT | Performed by: EMERGENCY MEDICINE

## 2024-11-16 PROCEDURE — 250N000009 HC RX 250: Performed by: EMERGENCY MEDICINE

## 2024-11-16 PROCEDURE — 85025 COMPLETE CBC W/AUTO DIFF WBC: CPT | Performed by: EMERGENCY MEDICINE

## 2024-11-16 PROCEDURE — 36415 COLL VENOUS BLD VENIPUNCTURE: CPT | Performed by: EMERGENCY MEDICINE

## 2024-11-16 PROCEDURE — 85014 HEMATOCRIT: CPT | Performed by: EMERGENCY MEDICINE

## 2024-11-16 PROCEDURE — 99285 EMERGENCY DEPT VISIT HI MDM: CPT | Mod: 25

## 2024-11-16 PROCEDURE — 250N000011 HC RX IP 250 OP 636: Performed by: EMERGENCY MEDICINE

## 2024-11-16 PROCEDURE — 80053 COMPREHEN METABOLIC PANEL: CPT | Performed by: EMERGENCY MEDICINE

## 2024-11-16 PROCEDURE — 250N000013 HC RX MED GY IP 250 OP 250 PS 637: Performed by: EMERGENCY MEDICINE

## 2024-11-16 PROCEDURE — 82962 GLUCOSE BLOOD TEST: CPT

## 2024-11-16 PROCEDURE — 74177 CT ABD & PELVIS W/CONTRAST: CPT

## 2024-11-16 PROCEDURE — 83690 ASSAY OF LIPASE: CPT | Performed by: EMERGENCY MEDICINE

## 2024-11-16 RX ORDER — ONDANSETRON 2 MG/ML
4 INJECTION INTRAMUSCULAR; INTRAVENOUS ONCE
Status: COMPLETED | OUTPATIENT
Start: 2024-11-16 | End: 2024-11-16

## 2024-11-16 RX ORDER — OXYCODONE HYDROCHLORIDE 5 MG/1
10 TABLET ORAL ONCE
Status: COMPLETED | OUTPATIENT
Start: 2024-11-16 | End: 2024-11-16

## 2024-11-16 RX ORDER — PANTOPRAZOLE SODIUM 40 MG/1
40 TABLET, DELAYED RELEASE ORAL DAILY
Qty: 30 TABLET | Refills: 0 | Status: SHIPPED | OUTPATIENT
Start: 2024-11-16 | End: 2024-12-16

## 2024-11-16 RX ORDER — IOPAMIDOL 755 MG/ML
78 INJECTION, SOLUTION INTRAVASCULAR ONCE
Status: COMPLETED | OUTPATIENT
Start: 2024-11-16 | End: 2024-11-16

## 2024-11-16 RX ORDER — MAGNESIUM HYDROXIDE/ALUMINUM HYDROXICE/SIMETHICONE 120; 1200; 1200 MG/30ML; MG/30ML; MG/30ML
15 SUSPENSION ORAL ONCE
Status: COMPLETED | OUTPATIENT
Start: 2024-11-16 | End: 2024-11-16

## 2024-11-16 RX ORDER — LIDOCAINE HYDROCHLORIDE 20 MG/ML
15 SOLUTION OROPHARYNGEAL ONCE
Status: COMPLETED | OUTPATIENT
Start: 2024-11-16 | End: 2024-11-16

## 2024-11-16 RX ADMIN — ALUMINUM HYDROXIDE, MAGNESIUM HYDROXIDE, AND SIMETHICONE 15 ML: 1200; 120; 1200 SUSPENSION ORAL at 12:55

## 2024-11-16 RX ADMIN — IOPAMIDOL 78 ML: 755 INJECTION, SOLUTION INTRAVENOUS at 14:11

## 2024-11-16 RX ADMIN — LIDOCAINE HYDROCHLORIDE 15 ML: 20 SOLUTION ORAL at 12:55

## 2024-11-16 RX ADMIN — OXYCODONE HYDROCHLORIDE 10 MG: 5 TABLET ORAL at 12:55

## 2024-11-16 RX ADMIN — ONDANSETRON 4 MG: 2 INJECTION INTRAMUSCULAR; INTRAVENOUS at 12:55

## 2024-11-16 ASSESSMENT — ACTIVITIES OF DAILY LIVING (ADL)
ADLS_ACUITY_SCORE: 0

## 2024-11-16 ASSESSMENT — COLUMBIA-SUICIDE SEVERITY RATING SCALE - C-SSRS
6. HAVE YOU EVER DONE ANYTHING, STARTED TO DO ANYTHING, OR PREPARED TO DO ANYTHING TO END YOUR LIFE?: NO
1. IN THE PAST MONTH, HAVE YOU WISHED YOU WERE DEAD OR WISHED YOU COULD GO TO SLEEP AND NOT WAKE UP?: NO
2. HAVE YOU ACTUALLY HAD ANY THOUGHTS OF KILLING YOURSELF IN THE PAST MONTH?: NO

## 2024-11-16 NOTE — ED TRIAGE NOTES
Pt arrives with family member for stomach pain that started four days ago. Starts after eating and lasts four hours. History of stomach rupture per pt. Taking pepto bismol with no relief.

## 2024-11-16 NOTE — LETTER
November 16, 2024      To Whom It May Concern:      Manny Sanchez was seen in our Emergency Department today, 11/16/24.  I expect his condition to improve over the next 1-3 days.  He may return to work/school when improved.    Sincerely,        Katie LAZO RN

## 2024-11-16 NOTE — DISCHARGE INSTRUCTIONS
Discharge Instructions  Abdominal Pain    Abdominal pain (belly pain) can be caused by many things. Your evaluation today does not show the exact cause for your pain. Your provider today has decided that it is unlikely your pain is due to a life threatening problem, or a problem requiring surgery or hospital admission. Sometimes those problems cannot be found right away, so it is very important that you follow up as directed.  Sometimes only the changes which occur over time allow the cause of your pain to be found.    Generally, every Emergency Department visit should have a follow-up clinic visit with either a primary or a specialty clinic/provider. Please follow-up as instructed by your emergency provider today. With abdominal pain, we often recommend very close follow-up, such as the following day.    ADULTS:  Return to the Emergency Department right away if:    You get an oral temperature above 102oF or as directed by your provider.  You have blood in your stools. This may be bright red or appear as black, tarry stools.    You keep vomiting (throwing up) or cannot drink liquids.  You see blood when you vomit.   You cannot have a bowel movement or you cannot pass gas.  Your stomach gets bloated or bigger.  Your skin or the whites of your eyes look yellow.  You faint.  You have bloody, frequent or painful urination (peeing).  You have new symptoms or anything that worries you.    CHILDREN:  Return to the Emergency Department right away if your child has any of the above-listed symptoms or the following:    Pushes your hand away or screams/cries when his/her belly is touched.  You notice your child is very fussy or weak.  Your child is very tired and is too tired to eat or drink.  Your child is dehydrated.  Signs of dehydration can be:  Significant change in the amount of wet diapers/urine.  Your infant or child starts to have dry mouth and lips, or no saliva (spit) or tears.    PREGNANT WOMEN:  Return to the  Emergency Department right away if you have any of the above-listed symptoms or the following:    You have bleeding, leaking fluid or passing tissue from the vagina.  You have worse pain or cramping, or pain in your shoulder or back.  You have vomiting that will not stop.  You have a temperature of 100oF or more.  Your baby is not moving as much as usual.  You faint.  You get a bad headache with or without eye problems and abdominal pain.  You have a seizure.  You have unusual discharge from your vagina and abdominal pain.    Abdominal pain is pretty common during pregnancy.  Your pain may or may not be related to your pregnancy. You should follow-up closely with your OB provider so they can evaluate you and your baby.  Until you follow-up with your regular provider, do the following:     Avoid sex and do not put anything in your vagina.  Drink clear fluids.  Only take medications approved by your provider.    MORE INFORMATION:    Appendicitis:  A possible cause of abdominal pain in any person who still has their appendix is acute appendicitis. Appendicitis is often hard to diagnose.  Testing does not always rule out early appendicitis or other causes of abdominal pain. Close follow-up with your provider and re-evaluations may be needed to figure out the reason for your abdominal pain.    Follow-up:  It is very important that you make an appointment with your clinic and go to the appointment.  If you do not follow-up with your primary provider, it may result in missing an important development which could result in permanent injury or disability and/or lasting pain.  If there is any problem keeping your appointment, call your provider or return to the Emergency Department.    Medications:  Take your medications as directed by your provider today.  Before using over-the-counter medications, ask your provider and make sure to take the medications as directed.  If you have any questions about medications, ask your  "provider.    Diet:  Resume your normal diet as much as possible, but do not eat fried, fatty or spicy foods while you have pain.  Do not drink alcohol or have caffeine.  Do not smoke tobacco.    Probiotics: If you have been given an antibiotic, you may want to also take a probiotic pill or eat yogurt with live cultures. Probiotics have \"good bacteria\" to help your intestines stay healthy. Studies have shown that probiotics help prevent diarrhea (loose stools) and other intestine problems (including C. diff infection) when you take antibiotics. You can buy these without a prescription in the pharmacy section of the store.     If you were given a prescription for medicine here today, be sure to read all of the information (including the package insert) that comes with your prescription.  This will include important information about the medicine, its side effects, and any warnings that you need to know about.  The pharmacist who fills the prescription can provide more information and answer questions you may have about the medicine.  If you have questions or concerns that the pharmacist cannot address, please call or return to the Emergency Department.       Remember that you can always come back to the Emergency Department if you are not able to see your regular provider in the amount of time listed above, if you get any new symptoms, or if there is anything that worries you.    Discharge Instructions  Hyperglycemia, High Blood Sugar    Today we found your blood sugar (glucose) was high. This may mean that you have developed diabetes, or if you already know that you have diabetes, it may mean that your diabetes is not as well controlled as it should be. Sometimes blood sugar can be high temporarily and it is not diabetes. Signs of elevated blood sugar include increased thirst, frequent urination (peeing), blurred vision, fatigue, unexplained weight loss, poor wound healing, and frequent infections.    We sometimes " give medicine in the Emergency Department to lower the blood sugar. We may also prescribe medicine for you to use at home, or increase the medicine that you already take. While we do not like to see your blood sugar high, it is much more dangerous to let your blood sugar get too low, so it is reasonable to take time to bring it down, or to wait and watch to see if it comes down on its own.    Generally, every Emergency Department visit should have a follow-up clinic visit with either a primary or a specialty clinic/provider. Please follow-up as instructed by your emergency provider today.     Return to the Emergency Department if you develop:  Vomiting (throwing up).  Confusion, disorientation, or being unable to wake up.  Severe weakness or illness.  Abdominal (belly) pain.    What can I do to help myself?  Check your blood sugar as instructed by your provider.  Take medications prescribed by your provider.  Follow a diabetic diet (low fat, low concentrated sweets, high fiber).  Exercise regularly.  Moderate or eliminate alcohol use.  Stop smoking.    Diabetes: Diabetes mellitus is a disease in which the body cannot regulate the amount of sugar (glucose) in the blood. Insulin allows glucose to move out of the blood into cells throughout the body where it is used for fuel. People with diabetes do not produce enough insulin (type 1 diabetes), or cannot use insulin properly (type 2 diabetes), or both. This starves the cells that need the glucose for fuel, and also harms certain organs and tissues exposed to the high glucose levels.  Over a long period of time, uncontrolled diabetes can lead to heart and blood vessel disease, blindness, kidney failure, foot ulcers and many other problems.          About 17 million Americans (6.2% of adults) have diabetes. We think that about one third of adults with diabetes do not know they have diabetes.  The incidence of diabetes is increasing rapidly. This increase is due to many  factors, but the most significant are our increasing weight and decreased activity levels.     Diabetes can be a very serious and life-threatening illness if not treated.  If you were given a prescription for medicine here today, be sure to read all of the information (including the package insert) that comes with your prescription.  This will include important information about the medicine, its side effects, and any warnings that you need to know about.  The pharmacist who fills the prescription can provide more information and answer questions you may have about the medicine.  If you have questions or concerns that the pharmacist cannot address, please call or return to the Emergency Department.   Remember that you can always come back to the Emergency Department if you are not able to see your regular provider in the amount of time listed above, if you get any new symptoms, or if there is anything that worries you.

## 2024-11-16 NOTE — ED PROVIDER NOTES
Emergency Department Note      History of Present Illness     Chief Complaint   Abdominal Pain    HPI A  was used.   Manny Sanchez is a 64 year old male with a history including acute perforated gastric ulcer with hemorrhage, GERD, hypertension, and type 2 diabetes mellitus who presents to the ED with family members for evaluation of abdominal pain. The patient reports for the last 4 days he's been experiencing throbbing abdominal pain after he eats. The pain lasts for 4 hours, before it subsides. He's been taking pepto bismol, but it provides incomplete relief. The pain isn't nearly as bad as his perforated gastric ulcer 6 years ago. No nausea, vomiting, diarrhea, or chest pain. Denies smoking or drinking.     Independent Historian   None    Review of External Notes   I reviewed medical records from: Hospital admission on 5/15/2019 for perforated gastric ulcer     Past Medical History     Medical History and Problem List   Type II Diabetes Mellitus  Hypertension  Acute perforated gastric ulcer with hemorrhage  GERD    Medications   Norvasc  Glucophage  Robaxin  Roxicodone  Lipitor  Farxiga  Januvia  Insulin glargine  Lisinopril  Protonix    Surgical History   Laparotomy exploratory     Physical Exam     Patient Vitals for the past 24 hrs:   BP Temp Temp src Pulse Resp SpO2 Weight   11/16/24 1651 119/73 -- -- 70 18 98 % --   11/16/24 1152 (!) 123/90 97.5  F (36.4  C) Temporal 93 18 99 % 70.5 kg (155 lb 6.8 oz)     Physical Exam  Constitutional: Well developed, nontox appearance  Head: Atraumatic.   Mouth/Throat: Oropharynx is clear and moist.   Neck:  no stridor  Eyes: no scleral icterus  Cardiovascular: RRR, 2+ bilat radial pulses  Pulmonary/Chest: nml resp effort, Clear BS bilat  Abdominal: ND, soft, epigastric tenderness, no rebound or guarding   Ext: Warm, well perfused  Neurological: A&O, symmetric facies, moves ext x4  Skin: Skin is warm and dry.   Psychiatric: Behavior is normal.  Thought content normal.   Nursing note and vitals reviewed.    Diagnostics     Lab Results   Labs Ordered and Resulted from Time of ED Arrival to Time of ED Departure   GLUCOSE BY METER - Abnormal       Result Value    GLUCOSE BY METER POCT 386 (*)    COMPREHENSIVE METABOLIC PANEL - Abnormal    Sodium 129 (*)     Potassium 4.8      Carbon Dioxide (CO2) 23      Anion Gap 12      Urea Nitrogen 25.1 (*)     Creatinine 1.17      GFR Estimate 69      Calcium 9.5      Chloride 94 (*)     Glucose 341 (*)     Alkaline Phosphatase 148      AST 22      ALT 25      Protein Total 7.6      Albumin 4.3      Bilirubin Total 0.3     CBC WITH PLATELETS AND DIFFERENTIAL - Abnormal    WBC Count 16.8 (*)     RBC Count 4.95      Hemoglobin 14.7      Hematocrit 43.4      MCV 88      MCH 29.7      MCHC 33.9      RDW 12.5      Platelet Count 240      % Neutrophils 79      % Lymphocytes 14      % Monocytes 6      % Eosinophils 1      % Basophils 1      % Immature Granulocytes 0      NRBCs per 100 WBC 0      Absolute Neutrophils 13.2 (*)     Absolute Lymphocytes 2.3      Absolute Monocytes 0.9      Absolute Eosinophils 0.2      Absolute Basophils 0.1      Absolute Immature Granulocytes 0.1      Absolute NRBCs 0.0     LIPASE - Normal    Lipase 51       Imaging   CT Abdomen Pelvis w Contrast   Final Result   IMPRESSION:    1.  No acute findings in the abdomen or pelvis.      2.  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        Independent Interpretation   None    ED Course      Medications Administered   Medications   lidocaine (viscous) (XYLOCAINE) 2 % solution 15 mL (15 mLs Mouth/Throat $Given 11/16/24 1255)   alum & mag hydroxide-simethicone (MAALOX) suspension 15 mL (15 mLs Oral $Given 11/16/24 1255)   oxyCODONE (ROXICODONE) tablet 10 mg (10 mg Oral $Given 11/16/24 1255)   ondansetron (ZOFRAN) injection 4 mg (4 mg Intravenous $Given 11/16/24 1255)   iopamidol  (ISOVUE-370) solution 78 mL (78 mLs Intravenous $Given 11/16/24 1411)   sodium chloride (PF) 0.9% PF flush 59 mL (59 mLs Intravenous $Given 11/16/24 1411)     Procedures   Procedures     Discussion of Management   None    ED Course   ED Course as of 11/16/24 1845   Sat Nov 16, 2024   1229 I obtained history and examined the patient as noted above.      Additional Documentation  Social Determinants of Health: age    Medical Decision Making / Diagnosis     MDM   64 year old male presenting w/ abdominal pain     Differential diagnosis includes perforated ulcer, gastric ulcer, peptic ulcer disease, duodenal ulcer, enteritis, colitis, GERD, biliary colic, hepatitis, pancreatitis, gastritis.  Less likely vascular catastrophe given history and physical exam.  Labs significant for mild hyponatremia likely secondary to hyperglycemia, leukocytosis.  CT abdomen pelvis without evidence of acute abnormality.  Interventions as noted above with improvement in symptoms.  Patient is likely experiencing gastritis or asymptomatic ulcer.  Prescriptions provided as noted below for outpatient management with gastroenterology referral placed prior to discharge.  At this time I feel the pt is safe for discharge.  Recommendations given regarding follow up with PCP, GI and return to the emergency department as needed for new or worsening symptoms.  Pt and family counseled on all results, disposition and diagnosis via .  They are understanding and agreeable to plan. Patient discharged in stable condition.      Disposition   The patient was discharged.     Diagnosis     ICD-10-CM    1. Acute gastritis without hemorrhage, unspecified gastritis type  K29.00 Adult GI  Referral - Consult Only     Adult GI  Referral - Procedure Only      2. Hyperglycemia  R73.9       3. Acute perforated gastric ulcer with hemorrhage (H)  K25.2 Adult GI  Referral - Consult Only     Adult GI  Referral - Procedure  Only           Discharge Medications   Discharge Medication List as of 11/16/2024  5:30 PM        START taking these medications    Details   magic mouthwash suspension (diphenhydrAMINE, lidocaine, aluminum-magnesium & simethicone) Take 15 mLs by mouth every 6 hours as needed (abdominal pain)., Disp-500 mL, R-0, E-Prescribe      pantoprazole (PROTONIX) 40 MG EC tablet Take 1 tablet (40 mg) by mouth daily., Disp-30 tablet, R-0, E-Prescribe           Scribe Disclosure:  I, Luciano Tay, am serving as a scribe at 12:31 PM on 11/16/2024 to document services personally performed by Alfonso Sánchez MD, based on my observations and the provider's statements to me.        Alfonso Sánchez MD  11/16/24 [id number]

## 2024-11-21 ENCOUNTER — APPOINTMENT (OUTPATIENT)
Dept: INTERPRETER SERVICES | Facility: CLINIC | Age: 64
End: 2024-11-21
Payer: COMMERCIAL

## 2024-12-01 ENCOUNTER — HEALTH MAINTENANCE LETTER (OUTPATIENT)
Age: 64
End: 2024-12-01

## 2025-01-04 ENCOUNTER — HOSPITAL ENCOUNTER (EMERGENCY)
Facility: CLINIC | Age: 65
Discharge: HOME | End: 2025-01-04
Attending: EMERGENCY MEDICINE
Payer: COMMERCIAL

## 2025-01-04 ENCOUNTER — APPOINTMENT (OUTPATIENT)
Dept: CT IMAGING | Facility: CLINIC | Age: 65
End: 2025-01-04
Attending: EMERGENCY MEDICINE
Payer: COMMERCIAL

## 2025-01-04 ENCOUNTER — APPOINTMENT (OUTPATIENT)
Dept: ULTRASOUND IMAGING | Facility: CLINIC | Age: 65
End: 2025-01-04
Attending: EMERGENCY MEDICINE
Payer: COMMERCIAL

## 2025-01-04 VITALS
HEART RATE: 69 BPM | WEIGHT: 157.41 LBS | OXYGEN SATURATION: 100 % | SYSTOLIC BLOOD PRESSURE: 127 MMHG | DIASTOLIC BLOOD PRESSURE: 75 MMHG | TEMPERATURE: 98.1 F | HEIGHT: 62 IN | BODY MASS INDEX: 28.97 KG/M2 | RESPIRATION RATE: 16 BRPM

## 2025-01-04 DIAGNOSIS — R10.13 EPIGASTRIC PAIN: ICD-10-CM

## 2025-01-04 LAB
ALBUMIN SERPL BCG-MCNC: 4 G/DL (ref 3.5–5.2)
ALP SERPL-CCNC: 136 U/L (ref 40–150)
ALT SERPL W P-5'-P-CCNC: 25 U/L (ref 0–70)
ANION GAP SERPL CALCULATED.3IONS-SCNC: 14 MMOL/L (ref 7–15)
AST SERPL W P-5'-P-CCNC: 27 U/L (ref 0–45)
BASOPHILS # BLD AUTO: 0.1 10E3/UL (ref 0–0.2)
BASOPHILS NFR BLD AUTO: 1 %
BILIRUB SERPL-MCNC: 0.2 MG/DL
BUN SERPL-MCNC: 33.7 MG/DL (ref 8–23)
CALCIUM SERPL-MCNC: 9.7 MG/DL (ref 8.8–10.4)
CHLORIDE SERPL-SCNC: 106 MMOL/L (ref 98–107)
CREAT SERPL-MCNC: 1 MG/DL (ref 0.67–1.17)
EGFRCR SERPLBLD CKD-EPI 2021: 83 ML/MIN/1.73M2
EOSINOPHIL # BLD AUTO: 0.4 10E3/UL (ref 0–0.7)
EOSINOPHIL NFR BLD AUTO: 2 %
ERYTHROCYTE [DISTWIDTH] IN BLOOD BY AUTOMATED COUNT: 13.2 % (ref 10–15)
GLUCOSE SERPL-MCNC: 165 MG/DL (ref 70–99)
HCO3 SERPL-SCNC: 16 MMOL/L (ref 22–29)
HCT VFR BLD AUTO: 48.9 % (ref 40–53)
HGB BLD-MCNC: 15.7 G/DL (ref 13.3–17.7)
HOLD SPECIMEN: NORMAL
HOLD SPECIMEN: NORMAL
IMM GRANULOCYTES # BLD: 0.1 10E3/UL
IMM GRANULOCYTES NFR BLD: 1 %
LIPASE SERPL-CCNC: 47 U/L (ref 13–60)
LYMPHOCYTES # BLD AUTO: 5.2 10E3/UL (ref 0.8–5.3)
LYMPHOCYTES NFR BLD AUTO: 27 %
MCH RBC QN AUTO: 28.8 PG (ref 26.5–33)
MCHC RBC AUTO-ENTMCNC: 32.1 G/DL (ref 31.5–36.5)
MCV RBC AUTO: 90 FL (ref 78–100)
MONOCYTES # BLD AUTO: 1.3 10E3/UL (ref 0–1.3)
MONOCYTES NFR BLD AUTO: 7 %
NEUTROPHILS # BLD AUTO: 12.1 10E3/UL (ref 1.6–8.3)
NEUTROPHILS NFR BLD AUTO: 63 %
NRBC # BLD AUTO: 0 10E3/UL
NRBC BLD AUTO-RTO: 0 /100
PLAT MORPH BLD: NORMAL
PLATELET # BLD AUTO: 307 10E3/UL (ref 150–450)
POTASSIUM SERPL-SCNC: 4.7 MMOL/L (ref 3.4–5.3)
PROT SERPL-MCNC: 7.6 G/DL (ref 6.4–8.3)
RBC # BLD AUTO: 5.45 10E6/UL (ref 4.4–5.9)
RBC MORPH BLD: NORMAL
SODIUM SERPL-SCNC: 136 MMOL/L (ref 135–145)
WBC # BLD AUTO: 19.2 10E3/UL (ref 4–11)

## 2025-01-04 PROCEDURE — 36415 COLL VENOUS BLD VENIPUNCTURE: CPT | Performed by: EMERGENCY MEDICINE

## 2025-01-04 PROCEDURE — 250N000011 HC RX IP 250 OP 636: Performed by: EMERGENCY MEDICINE

## 2025-01-04 PROCEDURE — 74177 CT ABD & PELVIS W/CONTRAST: CPT

## 2025-01-04 PROCEDURE — 85025 COMPLETE CBC W/AUTO DIFF WBC: CPT | Performed by: EMERGENCY MEDICINE

## 2025-01-04 PROCEDURE — 99285 EMERGENCY DEPT VISIT HI MDM: CPT | Mod: 25

## 2025-01-04 PROCEDURE — 80053 COMPREHEN METABOLIC PANEL: CPT | Performed by: EMERGENCY MEDICINE

## 2025-01-04 PROCEDURE — 96374 THER/PROPH/DIAG INJ IV PUSH: CPT | Mod: 59

## 2025-01-04 PROCEDURE — 83690 ASSAY OF LIPASE: CPT | Performed by: EMERGENCY MEDICINE

## 2025-01-04 PROCEDURE — 96375 TX/PRO/DX INJ NEW DRUG ADDON: CPT

## 2025-01-04 PROCEDURE — 96361 HYDRATE IV INFUSION ADD-ON: CPT

## 2025-01-04 PROCEDURE — 76705 ECHO EXAM OF ABDOMEN: CPT

## 2025-01-04 PROCEDURE — 258N000003 HC RX IP 258 OP 636: Performed by: EMERGENCY MEDICINE

## 2025-01-04 RX ORDER — IOPAMIDOL 755 MG/ML
500 INJECTION, SOLUTION INTRAVASCULAR ONCE
Status: COMPLETED | OUTPATIENT
Start: 2025-01-04 | End: 2025-01-04

## 2025-01-04 RX ORDER — SUCRALFATE ORAL 1 G/10ML
1 SUSPENSION ORAL 4 TIMES DAILY PRN
Qty: 414 ML | Refills: 0 | Status: SHIPPED | OUTPATIENT
Start: 2025-01-04

## 2025-01-04 RX ORDER — MORPHINE SULFATE 4 MG/ML
4 INJECTION, SOLUTION INTRAMUSCULAR; INTRAVENOUS
Status: DISCONTINUED | OUTPATIENT
Start: 2025-01-04 | End: 2025-01-04 | Stop reason: HOSPADM

## 2025-01-04 RX ORDER — ONDANSETRON 2 MG/ML
4 INJECTION INTRAMUSCULAR; INTRAVENOUS ONCE
Status: COMPLETED | OUTPATIENT
Start: 2025-01-04 | End: 2025-01-04

## 2025-01-04 RX ADMIN — ONDANSETRON 4 MG: 2 INJECTION INTRAMUSCULAR; INTRAVENOUS at 02:28

## 2025-01-04 RX ADMIN — IOPAMIDOL 80 ML: 755 INJECTION, SOLUTION INTRAVENOUS at 06:23

## 2025-01-04 RX ADMIN — MORPHINE SULFATE 4 MG: 4 INJECTION, SOLUTION INTRAMUSCULAR; INTRAVENOUS at 05:49

## 2025-01-04 RX ADMIN — SODIUM CHLORIDE 1000 ML: 9 INJECTION, SOLUTION INTRAVENOUS at 02:27

## 2025-01-04 ASSESSMENT — COLUMBIA-SUICIDE SEVERITY RATING SCALE - C-SSRS
6. HAVE YOU EVER DONE ANYTHING, STARTED TO DO ANYTHING, OR PREPARED TO DO ANYTHING TO END YOUR LIFE?: NO
2. HAVE YOU ACTUALLY HAD ANY THOUGHTS OF KILLING YOURSELF IN THE PAST MONTH?: NO
1. IN THE PAST MONTH, HAVE YOU WISHED YOU WERE DEAD OR WISHED YOU COULD GO TO SLEEP AND NOT WAKE UP?: NO

## 2025-01-04 ASSESSMENT — ACTIVITIES OF DAILY LIVING (ADL)
ADLS_ACUITY_SCORE: 47

## 2025-01-04 NOTE — ED PROVIDER NOTES
"  Emergency Department Note      History of Present Illness     Chief Complaint   Abdominal Pain    HPI   Manny Sanchez is a 64 year old male with history below who presents to the ED for abdominal pain. The patient reports he's been having severe epigastric pain since 9 PM yesterday (1/3). Had an episode of vomit here at the ED. No blood in stool. Patient takes Pantoprazole as he has a history of significant peptic ulcer disease requiring surgery. No alcohol intake.  No ibuprofen use.  No diarrhea.  No melena or hematochezia.  No chest pain or shortness of breath    Independent Historian   None as detailed above.    Review of External Notes   Care everywhere reviewed and [employer] updated    Past Medical History     Medical History and Problem List   Past Medical History:   Diagnosis Date    Diabetes mellitus     Hypertension     Peptic ulcer disease      Medications   amLODIPine (NORVASC) 5 MG tablet  metFORMIN (GLUCOPHAGE-XR) 500 MG 24 hr tablet      Surgical History   Past Surgical History:   Procedure Laterality Date    LAPAROTOMY EXPLORATORY N/A 5/16/2019    Procedure: Exploratory laparotomy, oversew and patching of perforated gastric ulcer.;  Surgeon: Mat Castro MD;  Location:  OR       Physical Exam     Patient Vitals for the past 24 hrs:   BP Temp Temp src Pulse Resp SpO2 Height Weight   01/04/25 0210 145/101 98.1  F (36.7  C) Temporal 70 20 100 % 1.58 m (5' 2.21\") 71.4 kg (157 lb 6.5 oz)     Physical Exam  Nursing note and vitals reviewed.  Constitutional: Cooperative.  Sitting up comfortably  HENT:   Mouth/Throat: Mucous membranes are normal.   Cardiovascular: Normal rate, regular rhythm and normal heart sounds.  No murmur.  Pulmonary/Chest: Effort normal and breath sounds normal. No respiratory distress. No wheezes. No rales.   Abdominal: Soft. Normal appearance and bowel sounds are normal. No distension. Epigastric tenderness. There is no rigidity and no guarding.   Neurological: Alert.  " Oriented x 3  Skin: Skin is warm and dry.   Psychiatric: Normal mood and affect.     Diagnostics     Lab Results   Labs Ordered and Resulted from Time of ED Arrival to Time of ED Departure   COMPREHENSIVE METABOLIC PANEL - Abnormal       Result Value    Sodium 136      Potassium 4.7      Carbon Dioxide (CO2) 16 (*)     Anion Gap 14      Urea Nitrogen 33.7 (*)     Creatinine 1.00      GFR Estimate 83      Calcium 9.7      Chloride 106      Glucose 165 (*)     Alkaline Phosphatase 136      AST 27      ALT 25      Protein Total 7.6      Albumin 4.0      Bilirubin Total 0.2     CBC WITH PLATELETS AND DIFFERENTIAL - Abnormal    WBC Count 19.2 (*)     RBC Count 5.45      Hemoglobin 15.7      Hematocrit 48.9      MCV 90      MCH 28.8      MCHC 32.1      RDW 13.2      Platelet Count 307      % Neutrophils 63      % Lymphocytes 27      % Monocytes 7      % Eosinophils 2      % Basophils 1      % Immature Granulocytes 1      NRBCs per 100 WBC 0      Absolute Neutrophils 12.1 (*)     Absolute Lymphocytes 5.2      Absolute Monocytes 1.3      Absolute Eosinophils 0.4      Absolute Basophils 0.1      Absolute Immature Granulocytes 0.1      Absolute NRBCs 0.0     LIPASE - Normal    Lipase 47     RBC AND PLATELET MORPHOLOGY    RBC Morphology Confirmed RBC Indices      Platelet Assessment        Value: Automated Count Confirmed. Platelet morphology is normal.     Imaging   US Abdomen Limited   Final Result   IMPRESSION:   1.  Normal limited abdominal ultrasound.            CT Abdomen Pelvis w Contrast   Final Result   IMPRESSION:    1.  No acute findings in the abdomen or pelvis.        Independent Interpretation   None    ED Course      Medications Administered   Medications   ondansetron (ZOFRAN) injection 4 mg (4 mg Intravenous $Given 1/4/25 0228)   sodium chloride 0.9% BOLUS 1,000 mL (1,000 mLs Intravenous $New Bag 1/4/25 0227)     Discussion of Management   None    ED Course   ED Course as of 01/04/25 0528   Sat Jan 04, 2025    8707 I obtained history and examined the patient as noted above.    0715     I rechecked the patient and discussed test results.   services once again used    Additional Documentation  None    Medical Decision Making / Diagnosis     OCTAVIO Sanchez is a 64 year old male with history of peptic ulcer disease on pantoprazole who presents with epigastric abdominal pain and vomiting.  Fortunately workup does not reveal an acute surgical cause.  Gallbladder was reassuring on ultrasound and CT scan does not show any evidence of free air, obstruction or other abnormalities.  On recheck patient is resting comfortably.  Recommended continue use of pantoprazole daily as well as Carafate to use as needed.  Return precautions discussed.  Also discussed scheduling follow-up for consideration of repeat EGD in the future.    Disposition   The patient was discharged.     Diagnosis     ICD-10-CM    1. Epigastric pain - suspect ulcer disease  R10.13            Discharge Medications   New Prescriptions    SUCRALFATE (CARAFATE) 1 GM/10ML SUSPENSION    Take 10 mLs (1 g) by mouth 4 times daily as needed (Epigastric pain).         Scribe Disclosure:  I, Aminah Osborn, am serving as a scribe at 5:17 AM on 1/4/2025 to document services personally performed by Ankur Dunham MD based on my observations and the provider's statements to me.        Ankur Dunham MD  01/04/25 5722

## 2025-01-04 NOTE — Clinical Note
Manny Sanchez was seen and treated in our emergency department on 1/4/2025.  He may return to work on 01/06/2025.  Mr. Manuel was seen in the emergency department this evening and not discharged till approximately 8 AM.  Please excuse him from time missed at work today and tomorrow to allow him to recover.  Thank you for your understanding and accommodation in advance.     If you have any questions or concerns, please don't hesitate to call.      Ankur Dunham MD

## 2025-01-04 NOTE — ED TRIAGE NOTES
Pt reports hx of ulcer. Pt seen 11/16 for same thing. Pt reports lots of burping and epigastric pain

## 2025-03-15 ENCOUNTER — HEALTH MAINTENANCE LETTER (OUTPATIENT)
Age: 65
End: 2025-03-15

## 2025-03-22 ENCOUNTER — HOSPITAL ENCOUNTER (EMERGENCY)
Facility: CLINIC | Age: 65
Discharge: HOME | End: 2025-03-22
Attending: EMERGENCY MEDICINE | Admitting: EMERGENCY MEDICINE
Payer: COMMERCIAL

## 2025-03-22 VITALS
SYSTOLIC BLOOD PRESSURE: 124 MMHG | TEMPERATURE: 98.5 F | DIASTOLIC BLOOD PRESSURE: 79 MMHG | RESPIRATION RATE: 18 BRPM | OXYGEN SATURATION: 97 % | WEIGHT: 167.11 LBS | BODY MASS INDEX: 30.36 KG/M2 | HEART RATE: 116 BPM

## 2025-03-22 DIAGNOSIS — R19.7 VOMITING AND DIARRHEA: ICD-10-CM

## 2025-03-22 DIAGNOSIS — R11.10 VOMITING AND DIARRHEA: ICD-10-CM

## 2025-03-22 LAB
ALBUMIN SERPL BCG-MCNC: 3.5 G/DL (ref 3.5–5.2)
ALP SERPL-CCNC: 97 U/L (ref 40–150)
ALT SERPL W P-5'-P-CCNC: 15 U/L (ref 0–70)
ANION GAP SERPL CALCULATED.3IONS-SCNC: 12 MMOL/L (ref 7–15)
AST SERPL W P-5'-P-CCNC: 17 U/L (ref 0–45)
BASOPHILS # BLD AUTO: 0.1 10E3/UL (ref 0–0.2)
BASOPHILS NFR BLD AUTO: 1 %
BILIRUB SERPL-MCNC: 0.4 MG/DL
BUN SERPL-MCNC: 13.9 MG/DL (ref 8–23)
CALCIUM SERPL-MCNC: 9 MG/DL (ref 8.8–10.4)
CHLORIDE SERPL-SCNC: 96 MMOL/L (ref 98–107)
CREAT SERPL-MCNC: 1.15 MG/DL (ref 0.67–1.17)
EGFRCR SERPLBLD CKD-EPI 2021: 70 ML/MIN/1.73M2
EOSINOPHIL # BLD AUTO: 0 10E3/UL (ref 0–0.7)
EOSINOPHIL NFR BLD AUTO: 0 %
ERYTHROCYTE [DISTWIDTH] IN BLOOD BY AUTOMATED COUNT: 14.6 % (ref 10–15)
GLUCOSE SERPL-MCNC: 239 MG/DL (ref 70–99)
HCO3 SERPL-SCNC: 22 MMOL/L (ref 22–29)
HCT VFR BLD AUTO: 42.1 % (ref 40–53)
HGB BLD-MCNC: 13.8 G/DL (ref 13.3–17.7)
IMM GRANULOCYTES # BLD: 0 10E3/UL
IMM GRANULOCYTES NFR BLD: 0 %
LYMPHOCYTES # BLD AUTO: 1.6 10E3/UL (ref 0.8–5.3)
LYMPHOCYTES NFR BLD AUTO: 11 %
MCH RBC QN AUTO: 27 PG (ref 26.5–33)
MCHC RBC AUTO-ENTMCNC: 32.8 G/DL (ref 31.5–36.5)
MCV RBC AUTO: 82 FL (ref 78–100)
MONOCYTES # BLD AUTO: 1 10E3/UL (ref 0–1.3)
MONOCYTES NFR BLD AUTO: 7 %
NEUTROPHILS # BLD AUTO: 11.5 10E3/UL (ref 1.6–8.3)
NEUTROPHILS NFR BLD AUTO: 81 %
NRBC # BLD AUTO: 0 10E3/UL
NRBC BLD AUTO-RTO: 0 /100
PLATELET # BLD AUTO: 354 10E3/UL (ref 150–450)
POTASSIUM SERPL-SCNC: 3.7 MMOL/L (ref 3.4–5.3)
PROT SERPL-MCNC: 6.8 G/DL (ref 6.4–8.3)
RBC # BLD AUTO: 5.11 10E6/UL (ref 4.4–5.9)
SODIUM SERPL-SCNC: 130 MMOL/L (ref 135–145)
WBC # BLD AUTO: 14.3 10E3/UL (ref 4–11)

## 2025-03-22 PROCEDURE — 96374 THER/PROPH/DIAG INJ IV PUSH: CPT

## 2025-03-22 PROCEDURE — 99284 EMERGENCY DEPT VISIT MOD MDM: CPT | Mod: 25

## 2025-03-22 PROCEDURE — 96361 HYDRATE IV INFUSION ADD-ON: CPT

## 2025-03-22 PROCEDURE — 85025 COMPLETE CBC W/AUTO DIFF WBC: CPT | Performed by: EMERGENCY MEDICINE

## 2025-03-22 PROCEDURE — 80053 COMPREHEN METABOLIC PANEL: CPT | Performed by: EMERGENCY MEDICINE

## 2025-03-22 PROCEDURE — 250N000011 HC RX IP 250 OP 636: Performed by: EMERGENCY MEDICINE

## 2025-03-22 PROCEDURE — 36415 COLL VENOUS BLD VENIPUNCTURE: CPT | Performed by: EMERGENCY MEDICINE

## 2025-03-22 PROCEDURE — 258N000003 HC RX IP 258 OP 636: Performed by: EMERGENCY MEDICINE

## 2025-03-22 RX ORDER — ONDANSETRON 4 MG/1
4 TABLET, ORALLY DISINTEGRATING ORAL EVERY 8 HOURS PRN
Qty: 10 TABLET | Refills: 0 | Status: SHIPPED | OUTPATIENT
Start: 2025-03-22 | End: 2025-03-25

## 2025-03-22 RX ORDER — ONDANSETRON 2 MG/ML
4 INJECTION INTRAMUSCULAR; INTRAVENOUS ONCE
Status: COMPLETED | OUTPATIENT
Start: 2025-03-22 | End: 2025-03-22

## 2025-03-22 RX ADMIN — SODIUM CHLORIDE 1000 ML: 9 INJECTION, SOLUTION INTRAVENOUS at 11:00

## 2025-03-22 RX ADMIN — ONDANSETRON 4 MG: 2 INJECTION, SOLUTION INTRAMUSCULAR; INTRAVENOUS at 10:59

## 2025-03-22 ASSESSMENT — ACTIVITIES OF DAILY LIVING (ADL)
ADLS_ACUITY_SCORE: 47
ADLS_ACUITY_SCORE: 47

## 2025-03-22 NOTE — ED PROVIDER NOTES
Emergency Department Note      History of Present Illness     Chief Complaint   Nausea, Vomiting, & Diarrhea      HPI   Manny Sanchez is a 65 year old male who presents to the ED for vomiting and diarrhea.  He was recently visiting in [place] for about 2 weeks.  He is not aware of any specific known ill contacts or contaminated food he may have ingested.  However, yesterday began to feel nauseous.  He has had 3-4 episodes of nonbloody bloody emesis and 3-4 episodes of nonbloody diarrhea.  He has abdominal cramping although no localizing pain.  No fevers or chills.  He says he has been unable to tolerate even water this morning.  No chest pain.  No shortness of breath.  He says he feels generally weak and fatigued.  He has a prior history of a peptic ulcer that perforated.  No pain in this area today.    Independent Historian   History taken from the patient's grandson    Past Medical History     Medical History and Problem List   Past Medical History:   Diagnosis Date    Diabetes mellitus, type II     Hypertension     Peptic ulcer disease        Medications   amLODIPine (NORVASC) 5 MG tablet  metFORMIN (GLUCOPHAGE-XR) 500 MG 24 hr tablet  ondansetron (ZOFRAN ODT) 4 MG ODT tab  sucralfate (CARAFATE) 1 GM/10ML suspension        Surgical History   Past Surgical History:   Procedure Laterality Date    LAPAROTOMY EXPLORATORY N/A 5/16/2019    Procedure: Exploratory laparotomy, oversew and patching of perforated gastric ulcer.;  Surgeon: Mat Castro MD;  Location:  OR       Physical Exam     Patient Vitals for the past 24 hrs:   BP Temp Temp src Pulse Resp SpO2 Weight   03/22/25 1029 124/79 98.5  F (36.9  C) Temporal 116 18 97 % 75.8 kg (167 lb 1.7 oz)     Physical Exam  Constitutional:       General: He is not in acute distress.     Appearance: Normal appearance. He is not toxic-appearing.   HENT:      Head: Atraumatic.   Eyes:      General: No scleral icterus.     Conjunctiva/sclera: Conjunctivae normal.    Cardiovascular:      Rate and Rhythm: Normal rate and regular rhythm.      Heart sounds: Normal heart sounds.   Pulmonary:      Effort: Pulmonary effort is normal. No respiratory distress.      Breath sounds: Normal breath sounds.   Abdominal:      General: There is no distension.      Palpations: Abdomen is soft.      Tenderness: There is no abdominal tenderness. There is no guarding or rebound.   Musculoskeletal:         General: No deformity.      Cervical back: Neck supple.   Skin:     General: Skin is warm.   Neurological:      Mental Status: He is alert.   Psychiatric:         Mood and Affect: Mood normal.         Behavior: Behavior normal.           Diagnostics     Lab Results   Labs Ordered and Resulted from Time of ED Arrival to Time of ED Departure   COMPREHENSIVE METABOLIC PANEL - Abnormal       Result Value    Sodium 130 (*)     Potassium 3.7      Carbon Dioxide (CO2) 22      Anion Gap 12      Urea Nitrogen 13.9      Creatinine 1.15      GFR Estimate 70      Calcium 9.0      Chloride 96 (*)     Glucose 239 (*)     Alkaline Phosphatase 97      AST 17      ALT 15      Protein Total 6.8      Albumin 3.5      Bilirubin Total 0.4     CBC WITH PLATELETS AND DIFFERENTIAL - Abnormal    WBC Count 14.3 (*)     RBC Count 5.11      Hemoglobin 13.8      Hematocrit 42.1      MCV 82      MCH 27.0      MCHC 32.8      RDW 14.6      Platelet Count 354      % Neutrophils 81      % Lymphocytes 11      % Monocytes 7      % Eosinophils 0      % Basophils 1      % Immature Granulocytes 0      NRBCs per 100 WBC 0      Absolute Neutrophils 11.5 (*)     Absolute Lymphocytes 1.6      Absolute Monocytes 1.0      Absolute Eosinophils 0.0      Absolute Basophils 0.1      Absolute Immature Granulocytes 0.0      Absolute NRBCs 0.0         ED Course      Medications Administered   Medications   sodium chloride 0.9% BOLUS 1,000 mL (0 mLs Intravenous Stopped 3/22/25 1200)   ondansetron (ZOFRAN) injection 4 mg (4 mg Intravenous $Given  3/22/25 0389)       Medical Decision Making / Diagnosis     OCTAVIO Sanchez is a 65 year old male who presents to the ED with vomiting and diarrhea after recently traveling to [place].  He was given IV normal saline and Zofran.  Labs demonstrate mild dehydration.    Patient's symptoms have resolved here.  He has no further nausea and is able to drink fluids.  On exam his abdomen is completely nontender.  He will have Zofran available at home.  His family system.  No evidence of appendicitis or surgical abdomen.  We discussed return precautions.    Disposition   The patient was discharged.     Diagnosis     ICD-10-CM    1. Vomiting and diarrhea  R11.10     R19.7            Discharge Medications   Discharge Medication List as of 3/22/2025 12:55 PM        START taking these medications    Details   ondansetron (ZOFRAN ODT) 4 MG ODT tab Take 1 tablet (4 mg) by mouth every 8 hours as needed., Disp-10 tablet, R-0, Local Print                     Javon Rae MD  03/22/25 1689

## 2025-03-22 NOTE — ED TRIAGE NOTES
Macedonian speaking, here with grandson with c/o N/V/D, started yesterday while in [place]. Reports generalized abd pain. HX: DM, on metformin, amlodipine and Carafate. Unsure if having fevers.      Triage Assessment (Adult)       Row Name 03/22/25 1030          Triage Assessment    Airway WDL WDL        Respiratory WDL    Respiratory WDL WDL        Skin Circulation/Temperature WDL    Skin Circulation/Temperature WDL WDL        Cardiac WDL    Cardiac WDL X  tachycardic        Peripheral/Neurovascular WDL    Peripheral Neurovascular WDL WDL        Cognitive/Neuro/Behavioral WDL    Cognitive/Neuro/Behavioral WDL WDL

## 2025-03-22 NOTE — LETTER
March 22, 2025      To Whom It May Concern:      Manny Sanchez was seen in our Emergency Department today, 03/22/25.  I expect his condition to improve over the next 1-2 days.  He may return to work 3/24/25 when improved.    Sincerely,      Katie LAZO RN  Electronically signed

## 2025-03-28 ENCOUNTER — APPOINTMENT (OUTPATIENT)
Dept: CT IMAGING | Facility: CLINIC | Age: 65
End: 2025-03-28
Attending: EMERGENCY MEDICINE
Payer: COMMERCIAL

## 2025-03-28 ENCOUNTER — APPOINTMENT (OUTPATIENT)
Dept: ULTRASOUND IMAGING | Facility: CLINIC | Age: 65
End: 2025-03-28
Attending: EMERGENCY MEDICINE
Payer: COMMERCIAL

## 2025-03-28 ENCOUNTER — HOSPITAL ENCOUNTER (EMERGENCY)
Facility: CLINIC | Age: 65
Discharge: HOME | End: 2025-03-29
Attending: EMERGENCY MEDICINE | Admitting: EMERGENCY MEDICINE
Payer: COMMERCIAL

## 2025-03-28 DIAGNOSIS — R07.9 RIGHT-SIDED CHEST PAIN: ICD-10-CM

## 2025-03-28 DIAGNOSIS — R10.10 UPPER ABDOMINAL PAIN: ICD-10-CM

## 2025-03-28 LAB
ALBUMIN SERPL BCG-MCNC: 4 G/DL (ref 3.5–5.2)
ALP SERPL-CCNC: 125 U/L (ref 40–150)
ALT SERPL W P-5'-P-CCNC: 16 U/L (ref 0–70)
ANION GAP SERPL CALCULATED.3IONS-SCNC: 16 MMOL/L (ref 7–15)
AST SERPL W P-5'-P-CCNC: 17 U/L (ref 0–45)
BASOPHILS # BLD AUTO: 0.1 10E3/UL (ref 0–0.2)
BASOPHILS NFR BLD AUTO: 1 %
BILIRUB SERPL-MCNC: 0.3 MG/DL
BUN SERPL-MCNC: 17.9 MG/DL (ref 8–23)
CALCIUM SERPL-MCNC: 9.7 MG/DL (ref 8.8–10.4)
CHLORIDE SERPL-SCNC: 102 MMOL/L (ref 98–107)
CREAT SERPL-MCNC: 1.21 MG/DL (ref 0.67–1.17)
D DIMER PPP FEU-MCNC: 1.1 UG/ML FEU (ref 0–0.5)
EGFRCR SERPLBLD CKD-EPI 2021: 66 ML/MIN/1.73M2
EOSINOPHIL # BLD AUTO: 0.1 10E3/UL (ref 0–0.7)
EOSINOPHIL NFR BLD AUTO: 1 %
ERYTHROCYTE [DISTWIDTH] IN BLOOD BY AUTOMATED COUNT: 14.6 % (ref 10–15)
GLUCOSE SERPL-MCNC: 139 MG/DL (ref 70–99)
HCO3 SERPL-SCNC: 17 MMOL/L (ref 22–29)
HCT VFR BLD AUTO: 43.4 % (ref 40–53)
HGB BLD-MCNC: 14.1 G/DL (ref 13.3–17.7)
HOLD SPECIMEN: NORMAL
IMM GRANULOCYTES # BLD: 0.2 10E3/UL
IMM GRANULOCYTES NFR BLD: 1 %
LIPASE SERPL-CCNC: 26 U/L (ref 13–60)
LYMPHOCYTES # BLD AUTO: 3.3 10E3/UL (ref 0.8–5.3)
LYMPHOCYTES NFR BLD AUTO: 18 %
MCH RBC QN AUTO: 26.5 PG (ref 26.5–33)
MCHC RBC AUTO-ENTMCNC: 32.5 G/DL (ref 31.5–36.5)
MCV RBC AUTO: 82 FL (ref 78–100)
MONOCYTES # BLD AUTO: 1.1 10E3/UL (ref 0–1.3)
MONOCYTES NFR BLD AUTO: 6 %
NEUTROPHILS # BLD AUTO: 13.5 10E3/UL (ref 1.6–8.3)
NEUTROPHILS NFR BLD AUTO: 74 %
NRBC # BLD AUTO: 0 10E3/UL
NRBC BLD AUTO-RTO: 0 /100
PLATELET # BLD AUTO: 449 10E3/UL (ref 150–450)
POTASSIUM SERPL-SCNC: 4 MMOL/L (ref 3.4–5.3)
PROT SERPL-MCNC: 8.1 G/DL (ref 6.4–8.3)
RBC # BLD AUTO: 5.32 10E6/UL (ref 4.4–5.9)
SODIUM SERPL-SCNC: 135 MMOL/L (ref 135–145)
TROPONIN T SERPL HS-MCNC: 20 NG/L
WBC # BLD AUTO: 18.3 10E3/UL (ref 4–11)

## 2025-03-28 PROCEDURE — 250N000013 HC RX MED GY IP 250 OP 250 PS 637: Performed by: EMERGENCY MEDICINE

## 2025-03-28 PROCEDURE — 85025 COMPLETE CBC W/AUTO DIFF WBC: CPT | Performed by: EMERGENCY MEDICINE

## 2025-03-28 PROCEDURE — 250N000009 HC RX 250: Performed by: EMERGENCY MEDICINE

## 2025-03-28 PROCEDURE — 76705 ECHO EXAM OF ABDOMEN: CPT

## 2025-03-28 PROCEDURE — 83690 ASSAY OF LIPASE: CPT | Performed by: EMERGENCY MEDICINE

## 2025-03-28 PROCEDURE — 84484 ASSAY OF TROPONIN QUANT: CPT | Performed by: EMERGENCY MEDICINE

## 2025-03-28 PROCEDURE — 36415 COLL VENOUS BLD VENIPUNCTURE: CPT | Performed by: EMERGENCY MEDICINE

## 2025-03-28 PROCEDURE — 82435 ASSAY OF BLOOD CHLORIDE: CPT | Performed by: EMERGENCY MEDICINE

## 2025-03-28 PROCEDURE — 84075 ASSAY ALKALINE PHOSPHATASE: CPT | Performed by: EMERGENCY MEDICINE

## 2025-03-28 PROCEDURE — 93005 ELECTROCARDIOGRAM TRACING: CPT

## 2025-03-28 PROCEDURE — 99285 EMERGENCY DEPT VISIT HI MDM: CPT | Mod: 25

## 2025-03-28 PROCEDURE — 71275 CT ANGIOGRAPHY CHEST: CPT

## 2025-03-28 PROCEDURE — 85379 FIBRIN DEGRADATION QUANT: CPT | Performed by: EMERGENCY MEDICINE

## 2025-03-28 PROCEDURE — 250N000011 HC RX IP 250 OP 636: Performed by: EMERGENCY MEDICINE

## 2025-03-28 RX ORDER — MAGNESIUM HYDROXIDE/ALUMINUM HYDROXICE/SIMETHICONE 120; 1200; 1200 MG/30ML; MG/30ML; MG/30ML
30 SUSPENSION ORAL ONCE
Status: COMPLETED | OUTPATIENT
Start: 2025-03-28 | End: 2025-03-28

## 2025-03-28 RX ORDER — ACETAMINOPHEN 500 MG
1000 TABLET ORAL ONCE
Status: COMPLETED | OUTPATIENT
Start: 2025-03-28 | End: 2025-03-28

## 2025-03-28 RX ORDER — IOPAMIDOL 755 MG/ML
500 INJECTION, SOLUTION INTRAVASCULAR ONCE
Status: COMPLETED | OUTPATIENT
Start: 2025-03-28 | End: 2025-03-28

## 2025-03-28 RX ORDER — FAMOTIDINE 20 MG/1
20 TABLET, FILM COATED ORAL ONCE
Status: COMPLETED | OUTPATIENT
Start: 2025-03-28 | End: 2025-03-28

## 2025-03-28 RX ADMIN — SODIUM CHLORIDE 89 ML: 9 INJECTION, SOLUTION INTRAVENOUS at 22:51

## 2025-03-28 RX ADMIN — ACETAMINOPHEN 1000 MG: 500 TABLET ORAL at 22:15

## 2025-03-28 RX ADMIN — FAMOTIDINE 20 MG: 20 TABLET, FILM COATED ORAL at 22:15

## 2025-03-28 RX ADMIN — IOPAMIDOL 68 ML: 755 INJECTION, SOLUTION INTRAVENOUS at 22:51

## 2025-03-28 RX ADMIN — ALUMINUM HYDROXIDE, MAGNESIUM HYDROXIDE, AND SIMETHICONE 30 ML: 200; 200; 20 SUSPENSION ORAL at 22:17

## 2025-03-28 ASSESSMENT — ACTIVITIES OF DAILY LIVING (ADL)
ADLS_ACUITY_SCORE: 47
ADLS_ACUITY_SCORE: 47

## 2025-03-28 NOTE — LETTER
[facility] EMERGENCY DEPT  [address]  [facility] [address]  [phone]    Manny Sanchez  [address] [address] [address]  [phone] (home)     : 1960      To Whom it may concern:    Manny Sanchez was seen in our Emergency Department today, 2025 for an injury that was reported to be work related.      For the next 2 days.    The employee might be taking medication so that they cannot operate moving machinery or perform activities that require balancing or working above ground.    {Restrictions:301513741}    {WC RESTRICT:974783}    Sincerely,    Leila Shepard RN    Electronically signed

## 2025-03-29 VITALS
DIASTOLIC BLOOD PRESSURE: 63 MMHG | OXYGEN SATURATION: 99 % | TEMPERATURE: 98.5 F | HEIGHT: 63 IN | RESPIRATION RATE: 16 BRPM | BODY MASS INDEX: 28.95 KG/M2 | SYSTOLIC BLOOD PRESSURE: 123 MMHG | HEART RATE: 71 BPM | WEIGHT: 163.36 LBS

## 2025-03-29 LAB — TROPONIN T SERPL HS-MCNC: 19 NG/L

## 2025-03-29 PROCEDURE — 84484 ASSAY OF TROPONIN QUANT: CPT | Performed by: EMERGENCY MEDICINE

## 2025-03-29 PROCEDURE — 36415 COLL VENOUS BLD VENIPUNCTURE: CPT | Performed by: EMERGENCY MEDICINE

## 2025-03-29 RX ORDER — SUCRALFATE 1 G/1
1 TABLET ORAL 4 TIMES DAILY PRN
Qty: 20 TABLET | Refills: 0 | Status: SHIPPED | OUTPATIENT
Start: 2025-03-29

## 2025-03-29 RX ORDER — OMEPRAZOLE 20 MG/1
20 CAPSULE, DELAYED RELEASE ORAL DAILY
Qty: 14 CAPSULE | Refills: 0 | Status: SHIPPED | OUTPATIENT
Start: 2025-03-29

## 2025-03-29 ASSESSMENT — ACTIVITIES OF DAILY LIVING (ADL): ADLS_ACUITY_SCORE: 47

## 2025-03-29 NOTE — ED TRIAGE NOTES
PT PETRONA NURSE APPT 01/25 DOES SHE NEED TO SEE YOU   RUQ abd pain for 1 week. Worse after eating. Pt reports he has had n/v/d intermittently as well.

## 2025-03-29 NOTE — ED PROVIDER NOTES
"  Emergency Department Note      History of Present Illness     Chief Complaint   Abdominal Pain    History was obtained through the use of a professional .   VITA Sanchez is a 65 year old male with a history of hypertension, diabetes mellitus type II, and peptic ulcer disease who presents to the ED with his children for evaluation of abdominal pain. Patient reports that he has been having abdominal pain for the past week. It is primarily epigastric pain, but there is also significant pain below his right rib cage which is mirrored in his back, and some pain in his lower abdomen. The pain is constant, but the discomfort increases when he eats. Eating is also followed immediately by diarrhea. Compared to the abdominal pain he was seen for in January, he is unable to say if it is similar because he does not remember well enough.    He has not had a colonoscopy or endoscopy. He denies melena, hematochezia, bloating, fever, or shortness of breath.     Independent Historian   None    Review of External Notes   US in January without gallstones    Past Medical History     Medical History and Problem List   Diabetes mellitus type II   Hypertension  Peptic ulcer disease    Medications   Norvasc  Glucophage  Carafate     Surgical History   Laparotomy exploratory     Physical Exam     Patient Vitals for the past 24 hrs:   BP Temp Temp src Pulse Resp SpO2 Height Weight   03/29/25 0140 123/63 -- -- 71 -- -- -- --   03/28/25 2147 (!) 126/95 98.5  F (36.9  C) Temporal 110 16 99 % 1.59 m (5' 2.6\") 74.1 kg (163 lb 5.8 oz)     Physical Exam    Eyes:               Sclera white; Pupils are equal and round  ENT:                External ears and nares normal  CV:                  Rate as above with regular rhythm   Resp:               Breath sounds clear and equal bilaterally                          Non-labored, no retractions or accessory muscle use  GI:                   Abdomen is soft, maximal " tenderness epigastric and RUQ, negative Zaman's                          No rebound tenderness or peritoneal features  MS:                  Moves all extremities  Skin:                Warm and dry  Neuro:             Speech is normal and fluent. No apparent deficit.          Diagnostics     Lab Results   Labs Ordered and Resulted from Time of ED Arrival to Time of ED Departure   COMPREHENSIVE METABOLIC PANEL - Abnormal       Result Value    Sodium 135      Potassium 4.0      Carbon Dioxide (CO2) 17 (*)     Anion Gap 16 (*)     Urea Nitrogen 17.9      Creatinine 1.21 (*)     GFR Estimate 66      Calcium 9.7      Chloride 102      Glucose 139 (*)     Alkaline Phosphatase 125      AST 17      ALT 16      Protein Total 8.1      Albumin 4.0      Bilirubin Total 0.3     CBC WITH PLATELETS AND DIFFERENTIAL - Abnormal    WBC Count 18.3 (*)     RBC Count 5.32      Hemoglobin 14.1      Hematocrit 43.4      MCV 82      MCH 26.5      MCHC 32.5      RDW 14.6      Platelet Count 449      % Neutrophils 74      % Lymphocytes 18      % Monocytes 6      % Eosinophils 1      % Basophils 1      % Immature Granulocytes 1      NRBCs per 100 WBC 0      Absolute Neutrophils 13.5 (*)     Absolute Lymphocytes 3.3      Absolute Monocytes 1.1      Absolute Eosinophils 0.1      Absolute Basophils 0.1      Absolute Immature Granulocytes 0.2      Absolute NRBCs 0.0     D DIMER QUANTITATIVE - Abnormal    D-Dimer Quantitative 1.10 (*)    LIPASE - Normal    Lipase 26     TROPONIN T, HIGH SENSITIVITY - Normal    Troponin T, High Sensitivity 20     TROPONIN T, HIGH SENSITIVITY - Normal    Troponin T, High Sensitivity 19         Imaging   CT Chest Pulmonary Embolism w Contrast   Final Result   IMPRESSION:   1.  No pulmonary embolism.         US Abdomen Limited (RUQ)   Final Result   IMPRESSION:   1.  Gallbladder is normal without stones, biliary dilatation or acute inflammatory signs, unchanged.      2.  Pancreas partially visualized due to overlying  bowel gas, grossly normal where seen. Otherwise, examination is normal and stable.          EKG   ECG taken at 2212, ECG read at 2214  Normal sinus rhythm   Rate 97 bpm. NV interval 138 ms. QRS duration 82 ms. QT/QTc 346/439 ms. P-R-T axes 33 12 79.    Independent Interpretation   CT: no central PE appreciated    ED Course      Medications Administered   Medications   acetaminophen (TYLENOL) tablet 1,000 mg (1,000 mg Oral $Given 3/28/25 2215)   famotidine (PEPCID) tablet 20 mg (20 mg Oral $Given 3/28/25 2215)   alum & mag hydroxide-simethicone (MAALOX) suspension 30 mL (30 mLs Oral $Given 3/28/25 2217)   sodium chloride 0.9 % bag for CT scan flush (89 mLs Intravenous $Given 3/28/25 2251)   iopamidol (ISOVUE-370) solution 500 mL (68 mLs Intravenous $Given 3/28/25 2251)       Procedures   Procedures     Discussion of Management   None    ED Course   ED Course as of 03/28/25 2205   Fri Mar 28, 2025   2154 I obtained the history and performed the examination as described.        Additional Documentation  None    Medical Decision Making / Diagnosis     CMS Diagnoses: None    MIPS       None    MDM   ECG demonstrates no ischemic changes, pre-excitation, pericarditis or dysrhythmia.  Differential includes NSTEMI, pneumonia, pneumothorax, pleural effusion, esophageal spasm, GERD.  PE positive due to age and tachycardia.  D dimer elevated.  CT without PE or intrathoracic findings.  Serial troponins normal and stable, no evidence for ACS or myocarditis.  US to evalaute upper abdominal sources of symptoms was negative.  No hepatitis or pancreatitis.  At this point my highest suspicion is for gastritis vs ulcer disease.  Follow up in clinic if symptoms persist.  If refractory to medications then outpatient endoscopy may be indicated.    Return immediately for worsening or any concern.    Disposition   The patient was discharged.     Diagnosis     ICD-10-CM    1. Right-sided chest pain  R07.9       2. Upper abdominal pain   R10.10            Discharge Medications   Discharge Medication List as of 3/29/2025  1:42 AM        START taking these medications    Details   omeprazole (PRILOSEC) 20 MG DR capsule Take 1 capsule (20 mg) by mouth daily., Disp-14 capsule, R-0, E-Prescribe      sucralfate (CARAFATE) 1 GM tablet Take 1 tablet (1 g) by mouth 4 times daily as needed (stomach pain or nausea)., Disp-20 tablet, R-0, E-Prescribe               Scribe Disclosure:  I, Ari Campbell, am serving as a scribe at 10:05 PM on 3/28/2025 to document services personally performed by Brenda Sepulveda MD based on my observations and the provider's statements to me.        Brenda Sepulveda MD  03/29/25 2010

## 2025-03-31 LAB
ATRIAL RATE - MUSE: 97 BPM
DIASTOLIC BLOOD PRESSURE - MUSE: NORMAL MMHG
INTERPRETATION ECG - MUSE: NORMAL
P AXIS - MUSE: 33 DEGREES
PR INTERVAL - MUSE: 138 MS
QRS DURATION - MUSE: 82 MS
QT - MUSE: 346 MS
QTC - MUSE: 439 MS
R AXIS - MUSE: 12 DEGREES
SYSTOLIC BLOOD PRESSURE - MUSE: NORMAL MMHG
T AXIS - MUSE: 79 DEGREES
VENTRICULAR RATE- MUSE: 97 BPM

## 2025-04-01 ENCOUNTER — PATIENT OUTREACH (OUTPATIENT)
Dept: CARE COORDINATION | Facility: CLINIC | Age: 65
End: 2025-04-01
Payer: COMMERCIAL

## 2025-04-01 NOTE — PROGRESS NOTES
[facility] Resource Center Contact  [facility]/Voicemail     Clinical Data: Care Coordination ED-sourced Outreach-     Outreach attempted x 2.  Left message on patient's voicemail, providing [facility]'s 24/7 scheduling and nurse triage phone number [phone] ([phone]) for questions/concerns and/or to schedule an appt with an [facility] provider.      Care Coordination introduction letter with explanation of Clinic Care Coordination services sent to patient via [employer]. Clinic Care Coordination services remain available via referral if needed.    Plan: [facility] will do no further outreaches at this time.       Kamala Hurtado MA  Connected Care Resource Center, [facility]    *Connected Care Resource Team does NOT follow patient ongoing. Referrals are identified based on internal discharge reports and the outreach is to ensure patient has an understanding of their discharge instructions.

## 2025-04-01 NOTE — LETTER
Manny Sanchez  [address] [facility] [address]    Dear Manny Sanchez,      I am a team member within the Connected Care Resource Center with [facility]. I recently tried to reach you to ensure you were doing well following a recent visit within our health system. I also wanted to take this chance to introduce Clinic Care Coordination.     Below is a description of Clinic Care Coordination and how this team can further assist you:       The Clinic Care Coordination team is made up of a Registered Nurse, , Financial Resource Worker, and a Community Health Worker who understand and can help navigate the health care system. The goal of clinic care coordination is to help you manage your health, improve access to care, and achieve optimal health outcomes. They work alongside your provider to assist you in determining your health and social needs, obtain health care and community resources, and provide you with necessary information and education. Clinic Care Coordination can work with you through any barriers and develop a care plan that helps coordinate and strengthen the relationship between you and your care team.    If you wish to connect with the Clinic Care Coordination Team, please let your [facility] Primary Care Provider or Clinic Care Team know and they can place a referral. The Clinic Care Coordination team will then reach out by phone to further support you.    We are focused on providing you with the highest-quality healthcare experience possible.    Sincerely,   Your care team with [facility]'s [address] [place] [address] ([phone]).    Kamala Hurtado, [facility]  Care Coordination

## 2025-06-28 ENCOUNTER — HEALTH MAINTENANCE LETTER (OUTPATIENT)
Age: 65
End: 2025-06-28

## 2025-07-18 ENCOUNTER — APPOINTMENT (OUTPATIENT)
Dept: CT IMAGING | Facility: CLINIC | Age: 65
End: 2025-07-18
Attending: STUDENT IN AN ORGANIZED HEALTH CARE EDUCATION/TRAINING PROGRAM
Payer: COMMERCIAL

## 2025-07-18 ENCOUNTER — HOSPITAL ENCOUNTER (EMERGENCY)
Facility: CLINIC | Age: 65
Discharge: HOME | End: 2025-07-18
Attending: STUDENT IN AN ORGANIZED HEALTH CARE EDUCATION/TRAINING PROGRAM | Admitting: STUDENT IN AN ORGANIZED HEALTH CARE EDUCATION/TRAINING PROGRAM
Payer: COMMERCIAL

## 2025-07-18 VITALS
HEIGHT: 63 IN | BODY MASS INDEX: 29.57 KG/M2 | WEIGHT: 166.89 LBS | RESPIRATION RATE: 16 BRPM | HEART RATE: 62 BPM | DIASTOLIC BLOOD PRESSURE: 69 MMHG | OXYGEN SATURATION: 96 % | SYSTOLIC BLOOD PRESSURE: 113 MMHG | TEMPERATURE: 97.9 F

## 2025-07-18 DIAGNOSIS — Z87.19 HX OF DUODENAL ULCER: ICD-10-CM

## 2025-07-18 DIAGNOSIS — R19.5 POSITIVE OCCULT STOOL BLOOD TEST: ICD-10-CM

## 2025-07-18 DIAGNOSIS — R10.13 ABDOMINAL PAIN, EPIGASTRIC: ICD-10-CM

## 2025-07-18 LAB
ALBUMIN SERPL BCG-MCNC: 4.1 G/DL (ref 3.5–5.2)
ALP SERPL-CCNC: 102 U/L (ref 40–150)
ALT SERPL W P-5'-P-CCNC: 27 U/L (ref 0–70)
ANION GAP SERPL CALCULATED.3IONS-SCNC: 16 MMOL/L (ref 7–15)
AST SERPL W P-5'-P-CCNC: 26 U/L (ref 0–45)
ATRIAL RATE - MUSE: 67 BPM
BASOPHILS # BLD AUTO: 0 10E3/UL (ref 0–0.2)
BASOPHILS NFR BLD AUTO: 0 %
BILIRUB SERPL-MCNC: 0.5 MG/DL
BUN SERPL-MCNC: 41.8 MG/DL (ref 8–23)
CALCIUM SERPL-MCNC: 9.6 MG/DL (ref 8.8–10.4)
CHLORIDE SERPL-SCNC: 98 MMOL/L (ref 98–107)
CREAT SERPL-MCNC: 1.45 MG/DL (ref 0.67–1.17)
DIASTOLIC BLOOD PRESSURE - MUSE: NORMAL MMHG
EGFRCR SERPLBLD CKD-EPI 2021: 53 ML/MIN/1.73M2
EOSINOPHIL # BLD AUTO: 0 10E3/UL (ref 0–0.7)
EOSINOPHIL NFR BLD AUTO: 0 %
ERYTHROCYTE [DISTWIDTH] IN BLOOD BY AUTOMATED COUNT: 15.1 % (ref 10–15)
GLUCOSE SERPL-MCNC: 214 MG/DL (ref 70–99)
HCO3 SERPL-SCNC: 16 MMOL/L (ref 22–29)
HCT VFR BLD AUTO: 41.3 % (ref 40–53)
HEMOCCULT STL QL: POSITIVE
HGB BLD-MCNC: 13.1 G/DL (ref 13.3–17.7)
HOLD SPECIMEN: NORMAL
HOLD SPECIMEN: NORMAL
IMM GRANULOCYTES # BLD: 0.1 10E3/UL
IMM GRANULOCYTES NFR BLD: 1 %
INTERPRETATION ECG - MUSE: NORMAL
LIPASE SERPL-CCNC: 37 U/L (ref 13–60)
LYMPHOCYTES # BLD AUTO: 1.7 10E3/UL (ref 0.8–5.3)
LYMPHOCYTES NFR BLD AUTO: 10 %
MCH RBC QN AUTO: 25.7 PG (ref 26.5–33)
MCHC RBC AUTO-ENTMCNC: 31.7 G/DL (ref 31.5–36.5)
MCV RBC AUTO: 81 FL (ref 78–100)
MONOCYTES # BLD AUTO: 0.7 10E3/UL (ref 0–1.3)
MONOCYTES NFR BLD AUTO: 4 %
NEUTROPHILS # BLD AUTO: 15 10E3/UL (ref 1.6–8.3)
NEUTROPHILS NFR BLD AUTO: 85 %
NRBC # BLD AUTO: 0 10E3/UL
NRBC BLD AUTO-RTO: 0 /100
P AXIS - MUSE: 9 DEGREES
PLATELET # BLD AUTO: 323 10E3/UL (ref 150–450)
POTASSIUM SERPL-SCNC: 4.9 MMOL/L (ref 3.4–5.3)
PR INTERVAL - MUSE: 120 MS
PROT SERPL-MCNC: 7.5 G/DL (ref 6.4–8.3)
QRS DURATION - MUSE: 80 MS
QT - MUSE: 370 MS
QTC - MUSE: 390 MS
R AXIS - MUSE: 18 DEGREES
RBC # BLD AUTO: 5.1 10E6/UL (ref 4.4–5.9)
SODIUM SERPL-SCNC: 130 MMOL/L (ref 135–145)
SYSTOLIC BLOOD PRESSURE - MUSE: NORMAL MMHG
T AXIS - MUSE: 98 DEGREES
UPPER GI ENDOSCOPY: NORMAL
VENTRICULAR RATE- MUSE: 67 BPM
WBC # BLD AUTO: 17.6 10E3/UL (ref 4–11)

## 2025-07-18 PROCEDURE — 85004 AUTOMATED DIFF WBC COUNT: CPT | Performed by: STUDENT IN AN ORGANIZED HEALTH CARE EDUCATION/TRAINING PROGRAM

## 2025-07-18 PROCEDURE — 74177 CT ABD & PELVIS W/CONTRAST: CPT

## 2025-07-18 PROCEDURE — 250N000009 HC RX 250: Performed by: INTERNAL MEDICINE

## 2025-07-18 PROCEDURE — 258N000003 HC RX IP 258 OP 636: Performed by: STUDENT IN AN ORGANIZED HEALTH CARE EDUCATION/TRAINING PROGRAM

## 2025-07-18 PROCEDURE — 96375 TX/PRO/DX INJ NEW DRUG ADDON: CPT | Mod: 59

## 2025-07-18 PROCEDURE — 36415 COLL VENOUS BLD VENIPUNCTURE: CPT | Performed by: STUDENT IN AN ORGANIZED HEALTH CARE EDUCATION/TRAINING PROGRAM

## 2025-07-18 PROCEDURE — 43239 EGD BIOPSY SINGLE/MULTIPLE: CPT | Performed by: INTERNAL MEDICINE

## 2025-07-18 PROCEDURE — 88305 TISSUE EXAM BY PATHOLOGIST: CPT | Mod: TC | Performed by: INTERNAL MEDICINE

## 2025-07-18 PROCEDURE — 999N000099 HC STATISTIC MODERATE SEDATION < 10 MIN: Performed by: INTERNAL MEDICINE

## 2025-07-18 PROCEDURE — 99285 EMERGENCY DEPT VISIT HI MDM: CPT | Mod: 25 | Performed by: STUDENT IN AN ORGANIZED HEALTH CARE EDUCATION/TRAINING PROGRAM

## 2025-07-18 PROCEDURE — 93005 ELECTROCARDIOGRAM TRACING: CPT | Mod: XU

## 2025-07-18 PROCEDURE — 250N000011 HC RX IP 250 OP 636: Performed by: INTERNAL MEDICINE

## 2025-07-18 PROCEDURE — 96361 HYDRATE IV INFUSION ADD-ON: CPT

## 2025-07-18 PROCEDURE — 80053 COMPREHEN METABOLIC PANEL: CPT | Performed by: STUDENT IN AN ORGANIZED HEALTH CARE EDUCATION/TRAINING PROGRAM

## 2025-07-18 PROCEDURE — 250N000011 HC RX IP 250 OP 636: Performed by: STUDENT IN AN ORGANIZED HEALTH CARE EDUCATION/TRAINING PROGRAM

## 2025-07-18 PROCEDURE — 83690 ASSAY OF LIPASE: CPT | Performed by: STUDENT IN AN ORGANIZED HEALTH CARE EDUCATION/TRAINING PROGRAM

## 2025-07-18 PROCEDURE — 82272 OCCULT BLD FECES 1-3 TESTS: CPT | Performed by: STUDENT IN AN ORGANIZED HEALTH CARE EDUCATION/TRAINING PROGRAM

## 2025-07-18 PROCEDURE — 250N000009 HC RX 250: Performed by: STUDENT IN AN ORGANIZED HEALTH CARE EDUCATION/TRAINING PROGRAM

## 2025-07-18 PROCEDURE — 250N000013 HC RX MED GY IP 250 OP 250 PS 637: Performed by: STUDENT IN AN ORGANIZED HEALTH CARE EDUCATION/TRAINING PROGRAM

## 2025-07-18 PROCEDURE — 96374 THER/PROPH/DIAG INJ IV PUSH: CPT | Mod: 59

## 2025-07-18 RX ORDER — LIDOCAINE HYDROCHLORIDE 20 MG/ML
15 SOLUTION OROPHARYNGEAL ONCE
Status: COMPLETED | OUTPATIENT
Start: 2025-07-18 | End: 2025-07-18

## 2025-07-18 RX ORDER — ONDANSETRON 2 MG/ML
4 INJECTION INTRAMUSCULAR; INTRAVENOUS EVERY 6 HOURS PRN
Status: DISCONTINUED | OUTPATIENT
Start: 2025-07-18 | End: 2025-07-18 | Stop reason: HOSPADM

## 2025-07-18 RX ORDER — NALOXONE HYDROCHLORIDE 0.4 MG/ML
0.4 INJECTION, SOLUTION INTRAMUSCULAR; INTRAVENOUS; SUBCUTANEOUS
Status: DISCONTINUED | OUTPATIENT
Start: 2025-07-18 | End: 2025-07-18 | Stop reason: HOSPADM

## 2025-07-18 RX ORDER — FENTANYL CITRATE 50 UG/ML
25-100 INJECTION, SOLUTION INTRAMUSCULAR; INTRAVENOUS EVERY 5 MIN PRN
Refills: 0 | Status: DISCONTINUED | OUTPATIENT
Start: 2025-07-18 | End: 2025-07-18 | Stop reason: HOSPADM

## 2025-07-18 RX ORDER — SIMETHICONE 40MG/0.6ML
133 SUSPENSION, DROPS(FINAL DOSAGE FORM)(ML) ORAL
Status: DISCONTINUED | OUTPATIENT
Start: 2025-07-18 | End: 2025-07-18 | Stop reason: HOSPADM

## 2025-07-18 RX ORDER — IOPAMIDOL 755 MG/ML
500 INJECTION, SOLUTION INTRAVASCULAR ONCE
Status: COMPLETED | OUTPATIENT
Start: 2025-07-18 | End: 2025-07-18

## 2025-07-18 RX ORDER — DIPHENHYDRAMINE HYDROCHLORIDE 50 MG/ML
25-50 INJECTION, SOLUTION INTRAMUSCULAR; INTRAVENOUS
Status: DISCONTINUED | OUTPATIENT
Start: 2025-07-18 | End: 2025-07-18 | Stop reason: HOSPADM

## 2025-07-18 RX ORDER — ATROPINE SULFATE 0.1 MG/ML
1 INJECTION INTRAVENOUS
Status: DISCONTINUED | OUTPATIENT
Start: 2025-07-18 | End: 2025-07-18 | Stop reason: HOSPADM

## 2025-07-18 RX ORDER — NALOXONE HYDROCHLORIDE 0.4 MG/ML
0.2 INJECTION, SOLUTION INTRAMUSCULAR; INTRAVENOUS; SUBCUTANEOUS
Status: DISCONTINUED | OUTPATIENT
Start: 2025-07-18 | End: 2025-07-18 | Stop reason: HOSPADM

## 2025-07-18 RX ORDER — ONDANSETRON 2 MG/ML
4 INJECTION INTRAMUSCULAR; INTRAVENOUS
Status: CANCELLED | OUTPATIENT
Start: 2025-07-18

## 2025-07-18 RX ORDER — FLUMAZENIL 0.1 MG/ML
0.2 INJECTION, SOLUTION INTRAVENOUS
Status: DISCONTINUED | OUTPATIENT
Start: 2025-07-18 | End: 2025-07-18 | Stop reason: HOSPADM

## 2025-07-18 RX ORDER — OXYCODONE HYDROCHLORIDE 5 MG/1
10 TABLET ORAL
Refills: 0 | Status: COMPLETED | OUTPATIENT
Start: 2025-07-18 | End: 2025-07-18

## 2025-07-18 RX ORDER — MAGNESIUM HYDROXIDE/ALUMINUM HYDROXICE/SIMETHICONE 120; 1200; 1200 MG/30ML; MG/30ML; MG/30ML
15 SUSPENSION ORAL ONCE
Status: COMPLETED | OUTPATIENT
Start: 2025-07-18 | End: 2025-07-18

## 2025-07-18 RX ORDER — ONDANSETRON 4 MG/1
4 TABLET, ORALLY DISINTEGRATING ORAL EVERY 6 HOURS PRN
Status: DISCONTINUED | OUTPATIENT
Start: 2025-07-18 | End: 2025-07-18 | Stop reason: HOSPADM

## 2025-07-18 RX ORDER — PROCHLORPERAZINE MALEATE 5 MG/1
5 TABLET ORAL EVERY 6 HOURS PRN
Status: DISCONTINUED | OUTPATIENT
Start: 2025-07-18 | End: 2025-07-18 | Stop reason: HOSPADM

## 2025-07-18 RX ORDER — EPINEPHRINE 1 MG/ML
0.1 INJECTION, SOLUTION, CONCENTRATE INTRAVENOUS
Status: DISCONTINUED | OUTPATIENT
Start: 2025-07-18 | End: 2025-07-18 | Stop reason: HOSPADM

## 2025-07-18 RX ORDER — LIDOCAINE 40 MG/G
CREAM TOPICAL
Status: CANCELLED | OUTPATIENT
Start: 2025-07-18

## 2025-07-18 RX ADMIN — SODIUM CHLORIDE 61 ML: 9 INJECTION, SOLUTION INTRAVENOUS at 10:59

## 2025-07-18 RX ADMIN — SODIUM CHLORIDE 1000 ML: 0.9 INJECTION, SOLUTION INTRAVENOUS at 10:12

## 2025-07-18 RX ADMIN — MIDAZOLAM 2 MG: 1 INJECTION INTRAMUSCULAR; INTRAVENOUS at 16:03

## 2025-07-18 RX ADMIN — OXYCODONE HYDROCHLORIDE 10 MG: 5 TABLET ORAL at 10:47

## 2025-07-18 RX ADMIN — TOPICAL ANESTHETIC 0.5 ML: 200 SPRAY DENTAL; PERIODONTAL at 16:02

## 2025-07-18 RX ADMIN — ALUMINUM HYDROXIDE, MAGNESIUM HYDROXIDE, AND DIMETHICONE 15 ML: 200; 20; 200 SUSPENSION ORAL at 10:18

## 2025-07-18 RX ADMIN — PANTOPRAZOLE SODIUM 40 MG: 40 INJECTION, POWDER, FOR SOLUTION INTRAVENOUS at 10:17

## 2025-07-18 RX ADMIN — IOPAMIDOL 83 ML: 755 INJECTION, SOLUTION INTRAVENOUS at 10:59

## 2025-07-18 RX ADMIN — FENTANYL CITRATE 100 MCG: 50 INJECTION, SOLUTION INTRAMUSCULAR; INTRAVENOUS at 16:03

## 2025-07-18 RX ADMIN — LIDOCAINE HYDROCHLORIDE 15 ML: 20 SOLUTION ORAL at 10:18

## 2025-07-18 ASSESSMENT — ACTIVITIES OF DAILY LIVING (ADL)
ADLS_ACUITY_SCORE: 47

## 2025-07-18 NOTE — ED TRIAGE NOTES
"Presents to ED with one week of abdominal pain. States he has gastritis. States pain is \"all over\" abdomen. Denies nausea or vomiting. ABCs intact. A&OX4.      Triage Assessment (Adult)       Row Name 07/18/25 0917          Triage Assessment    Airway WDL WDL        Respiratory WDL    Respiratory WDL WDL        Skin Circulation/Temperature WDL    Skin Circulation/Temperature WDL WDL        Cardiac WDL    Cardiac WDL WDL        Peripheral/Neurovascular WDL    Peripheral Neurovascular WDL WDL        Cognitive/Neuro/Behavioral WDL    Cognitive/Neuro/Behavioral WDL WDL                     "

## 2025-07-18 NOTE — LETTER
[facility] ENDOSCOPY [place]  [address] [facility] [address]  Phone: [phone]  Fax: [phone]    July 18, 2025        Manny Sanchez  [address] [address] [facility] [address]          To whom it may concern:    RE: Manny Sanchez    Patient was seen and treated today at our clinic and missed work.    Please contact me for questions or concerns.      Sincerely,          Felix Lucas MD

## 2025-07-18 NOTE — PROGRESS NOTES
Pre-Endoscopy History and Physical     Manny Sanchez MRN# [id number]   YOB: 1960 Age: 65 year old     Date of Procedure: 7/18/2025  Primary care provider: Lakisha [facility]  Type of Endoscopy: esophagogastroduodenoscopy (upper GI endoscopy)  Reason for Procedure: epigastric pain, history of ulcer  Type of Anesthesia Anticipated: Moderate (conscious) sedation    HPI:    Manny is a 65 year old male who will be undergoing the above procedure.      A history and physical has been performed. The patient's medications and allergies have been reviewed. The risks and benefits of the procedure and the sedation options and risks were discussed with the patient.  All questions were answered and informed consent was obtained.      He denies a personal or family history of anesthesia complications or bleeding disorders.     Allergies   Allergen Reactions    Nsaids      Perforated gastric ulcer        Current Facility-Administered Medications   Medication Dose Route Frequency Provider Last Rate Last Admin    atropine injection 1 mg  1 mg Intravenous Once PRN Felix Lucas MD        benzocaine 20% (HURRICAINE/TOPEX) 20 % spray 0.5 mL  1 spray Mouth/Throat Once PRN Felix Lucas MD        diphenhydrAMINE (BENADRYL) injection 25-50 mg  25-50 mg Intravenous Once PRN Felix Lucas MD        EPINEPHrine PF (ADRENALIN) injection 0.1 mg  0.1 mg Submucosal Once PRN Felix Lucas MD        fentaNYL (PF) (SUBLIMAZE) injection  mcg   mcg Intravenous Q5 Min PRN Felix Lucas MD        flumazenil (ROMAZICON) injection 0.2 mg  0.2 mg Intravenous q1 min prn Felix Lucas MD        glucagon injection 0.5 mg  0.5 mg Intravenous Once PRN Felix Lucas MD        midazolam (VERSED) injection 0.5-2 mg  0.5-2 mg Intravenous Q4 Min PRN Felix Lucas MD        naloxone (NARCAN) injection 0.2 mg  0.2 mg Intravenous Q2 Min PRN Fleix Lucas MD        Or  "   naloxone (NARCAN) injection 0.4 mg  0.4 mg Intravenous Q2 Min PRN Felix Lucas MD        Or    naloxone (NARCAN) injection 0.2 mg  0.2 mg Intramuscular Q2 Min PRN Felix Lucas MD        Or    naloxone (NARCAN) injection 0.4 mg  0.4 mg Intramuscular Q2 Min PRN Felix Lucas MD        simethicone (MYLICON) suspension 133 mg  133 mg Oral Once PRN Felix Lucas MD        sodium chloride (PF) 0.9% PF flush 3 mL  3 mL Intravenous q1 min prn Felix Lucas MD        sodium chloride 0.9% BOLUS 500 mL  500 mL Intravenous Once PRN Felix Lucas MD           Patient Active Problem List   Diagnosis    Acute perforated gastric ulcer with hemorrhage (H)        Past Medical History:   Diagnosis Date    Diabetes mellitus, type II     Hypertension     Peptic ulcer disease         Past Surgical History:   Procedure Laterality Date    LAPAROTOMY EXPLORATORY N/A 5/16/2019    Procedure: Exploratory laparotomy, oversew and patching of perforated gastric ulcer.;  Surgeon: Mat Castro MD;  Location:  OR       Social History     Tobacco Use    Smoking status: Never    Smokeless tobacco: Never   Substance Use Topics    Alcohol use: Not on file       History reviewed. No pertinent family history.    REVIEW OF SYSTEMS:     5 point ROS negative except as noted above in HPI, including Gen., Resp., CV, GI &  system review.    PHYSICAL EXAM:   /79   Pulse 73   Temp 97.9  F (36.6  C) (Temporal)   Resp 18   Ht 1.59 m (5' 2.6\")   Wt 75.7 kg (166 lb 14.2 oz)   SpO2 100%   BMI 29.94 kg/m   Estimated body mass index is 29.94 kg/m  as calculated from the following:    Height as of this encounter: 1.59 m (5' 2.6\").    Weight as of this encounter: 75.7 kg (166 lb 14.2 oz).   GENERAL APPEARANCE: healthy  MENTAL STATUS: alert  AIRWAY EXAM: Mallampatti Class I (visualization of the soft palate, fauces, uvula, anterior and posterior pillars)  RESP: lungs clear to auscultation - no rales, rhonchi " or wheezes  CV: regular rates and rhythm    DIAGNOSTICS:      Not indicated    IMPRESSION     ASA Class 2 - Mild systemic disease    PLAN:     EGD    The above has been forwarded to the consulting provider.    Signed Electronically by: Felix Lucas MD  July 18, 2025

## 2025-07-18 NOTE — ED PROVIDER NOTES
"  Emergency Department Note      History of Present Illness     Chief Complaint   Abdominal Pain      HPI   Manny Sanchez is a 65 year old male with history of type 2 diabetes, hypertension, and peptic ulcer disease who presents for abdominal pain. Patient reports one week of abdominal pain that worsens immediately post eating. The pain is not stabbing and does not worsen after drinking water. He states the pain feels similar to history of perforated gastric ulcer, which he had laparoscopic surgery for in 2020. Patient reports increased water intake and minimal food intake lately due to the pain. He also reports diarrhea -- no hematochezia. No nausea, vomiting, fevers, chills, or back pain. No urinary symptoms. No NSAID or alcohol use. No history of H. Pylori. Patient has been on pantoprazole since ulcer surgery in 2020. No other abdominal surgeries -- appendix and gallbladder are still in place.      used to obtain history.     Independent Historian   None    Review of External Notes   I reviewed colonoscopy with [facility] from 2019- duodenal ulcer findings    Past Medical History     Medical History and Problem List   Type 2 diabetes  Hypertension  Peptic ulcer disease  Microalbuminuria   Chronic pain of left knee    Medications   Amlodipine  Lisinopril   Metformin  Lipitor   Pantoprazole  Omeprazole  Carafate     Surgical History   Laparotomy exploratory     Physical Exam     Patient Vitals for the past 24 hrs:   BP Temp Temp src Pulse Resp SpO2 Height Weight   07/18/25 0917 119/79 97.9  F (36.6  C) Temporal 73 18 100 % 1.59 m (5' 2.6\") 75.7 kg (166 lb 14.2 oz)     Physical Exam  Vital signs and nursing notes reviewed.    General:  Alert and oriented, no acute distress. Resting on bed with wife at bedside.   Skin: Skin is warm and dry. No rash. No diaphoresis.  HEENT:   Head: Normocephalic, atraumatic. Facial features symmetric.   Eyes: Conjunctiva pink, sclera white. EOMs grossly intact.   Ears: " Auricles without lesion, erythema, or edema.   Nose: Symmetric with no discharge.  Mouth and throat: Lips are moist with no lesions or edema  Neck: Normal range of motion.   CV:  Heart RRR. 2+ radial pulses bilaterally.   Pulm/Chest: Chest wall expansion symmetric with no increased effort of breathing. Lungs clear and equal to auscultation bilaterally.   Abd: Abdomen is soft and tender to palpation in upper and mid abdomen with no guarding or rebound.   M/S: Moves all extremities spontaneously.  Psych: Normal mood and affect. Behavior is normal.     Diagnostics     Lab Results   Labs Ordered and Resulted from Time of ED Arrival to Time of ED Departure   COMPREHENSIVE METABOLIC PANEL - Abnormal       Result Value    Sodium 130 (*)     Potassium 4.9      Carbon Dioxide (CO2) 16 (*)     Anion Gap 16 (*)     Urea Nitrogen 41.8 (*)     Creatinine 1.45 (*)     GFR Estimate 53 (*)     Calcium 9.6      Chloride 98      Glucose 214 (*)     Alkaline Phosphatase 102      AST 26      ALT 27      Protein Total 7.5      Albumin 4.1      Bilirubin Total 0.5     CBC WITH PLATELETS AND DIFFERENTIAL - Abnormal    WBC Count 17.6 (*)     RBC Count 5.10      Hemoglobin 13.1 (*)     Hematocrit 41.3      MCV 81      MCH 25.7 (*)     MCHC 31.7      RDW 15.1 (*)     Platelet Count 323      % Neutrophils 85      % Lymphocytes 10      % Monocytes 4      % Eosinophils 0      % Basophils 0      % Immature Granulocytes 1      NRBCs per 100 WBC 0      Absolute Neutrophils 15.0 (*)     Absolute Lymphocytes 1.7      Absolute Monocytes 0.7      Absolute Eosinophils 0.0      Absolute Basophils 0.0      Absolute Immature Granulocytes 0.1      Absolute NRBCs 0.0     OCCULT BLOOD STOOL - Abnormal    Occult Blood Positive (*)    LIPASE - Normal    Lipase 37       Imaging   CT Abdomen Pelvis w Contrast   Final Result   IMPRESSION:    1.  No acute intra-abdominal or intrapelvic process.   2.  Nonacute findings as above.        EKG   ECG results from  07/18/25   EKG 12-lead, tracing only     Value    Systolic Blood Pressure     Diastolic Blood Pressure     Ventricular Rate 67    Atrial Rate 67    CO Interval 120    QRS Duration 80        QTc 390    P Axis 9    R AXIS 18    T Axis 98    Interpretation ECG      Sinus rhythm  Abnormal QRS-T angle, consider primary T wave abnormality  Abnormal ECG  When compared with ECG of 28-Mar-2025 22:12,  No significant change was found  Unconfirmed report - interpretation of this ECG is computer generated - see medical record for final interpretation  Confirmed by - EMERGENCY ROOM, PHYSICIAN ([id number]),  Antonio Bermudez ([id number]) on 7/18/2025 10:01:00 AM       Independent Interpretation   None    ED Course      Medications Administered   Medications   pantoprazole (PROTONIX) injection 40 mg (40 mg Intravenous $Given 7/18/25 1017)   sodium chloride 0.9% BOLUS 1,000 mL (1,000 mLs Intravenous $New Bag 7/18/25 1012)   alum & mag hydroxide-simethicone (MAALOX) suspension 15 mL (15 mLs Oral $Given 7/18/25 1018)   lidocaine (viscous) (XYLOCAINE) 2 % solution 15 mL (15 mLs Mouth/Throat $Given 7/18/25 1018)   oxyCODONE (ROXICODONE) tablet 10 mg (10 mg Oral $Given 7/18/25 1047)   iopamidol (ISOVUE-370) solution 500 mL (83 mLs Intravenous $Given 7/18/25 1059)   sodium chloride 0.9 % bag for CT scan flush (61 mLs Intravenous $Given 7/18/25 1059)     Discussion of Management   Dr. Lance HAYES     ED Course   ED Course as of 07/19/25 0852   Fri Jul 18, 2025   0927 I initially assessed the patient and obtained the above history and physical exam.     1019 I reassessed the patient and updated them on results and plan of care.      1043 Performed CHARLES  Abd pain much better 4/10   1257 I spoke with FREDDY Holguin, regarding the patient. They will scope him today.    1303 I reassessed the patient and updated them on results and plan of care.   Zero pain. Agreeable to wait for scope. NPO   1642 I spoke to Dr. Lucas with GI- negative  scope. Trying different PPI. Can discharge from endoscopy.      Additional Documentation  None    Medical Decision Making / Diagnosis     CMS Diagnoses: None    MIPS   None    MDM   Manny Sanchez is a 65 year old male with a history of peptic ulcer disease who presents to the ED for evaluation of severe upper abdominal pain. See HPI. Vitals reassuring.   Pain significantly improves with GI cocktail. Broad differential considered. CT abd without diverticulitis, bowel obstruction, pancreatitis, cholecystitis, nephrolithiasis, hydronephrosis. Do not suspect AAA with pain so improved with GI cocktail. No HTN.   Labs reveal leukocytosis which is frequently the case for the patient. No significant anemia with hgb at 13.1. CMP with hyponatremia at 130, he was hydrated here with IV fluids. BUN elevated at 41.8 and creat at 1.45, mild bump from his baseline.  No renal abnormalities. Occult blood stool test was positive. High suspicion for degree of GERD, possible peptic ulcer disease, upper GI bleed given history. He has been taking a PPI. Spoke with GI who agreed to take him for EGD today. If reassuring findings, he can discharge home with medication recommendations per GI. Return precautions reviewed. Patient agreeable to plan and had questions answered.     Disposition   The patient was discharged to endoscopy.     Diagnosis     ICD-10-CM    1. Abdominal pain, epigastric  R10.13       2. Hx of duodenal ulcer  Z87.19       3. Positive occult stool blood test  R19.5            Discharge Medications   New Prescriptions    No medications on file       Scribe Disclosure:  Claudine AMES, am serving as a scribe at 9:34 AM on 7/18/2025 to document services personally performed by Chiara Hull PA-C based on my observations and the provider's statements to me.     Chiara Hull PA-C on 7/19/2025 at 9:01 AM         Chiara Hull PA-C  07/19/25 0901

## 2025-07-19 ENCOUNTER — HEALTH MAINTENANCE LETTER (OUTPATIENT)
Age: 65
End: 2025-07-19

## 2025-07-22 LAB
PATH REPORT.COMMENTS IMP SPEC: NORMAL
PATH REPORT.COMMENTS IMP SPEC: NORMAL
PATH REPORT.FINAL DX SPEC: NORMAL
PATH REPORT.GROSS SPEC: NORMAL
PATH REPORT.MICROSCOPIC SPEC OTHER STN: NORMAL
PATH REPORT.RELEVANT HX SPEC: NORMAL
PHOTO IMAGE: NORMAL

## 2025-07-22 PROCEDURE — 88305 TISSUE EXAM BY PATHOLOGIST: CPT | Mod: 26 | Performed by: PATHOLOGY

## 2025-08-24 ENCOUNTER — HOSPITAL ENCOUNTER (EMERGENCY)
Facility: CLINIC | Age: 65
Discharge: HOME | End: 2025-08-24
Attending: EMERGENCY MEDICINE | Admitting: EMERGENCY MEDICINE
Payer: COMMERCIAL

## 2025-08-24 VITALS
WEIGHT: 164.68 LBS | TEMPERATURE: 98.4 F | HEART RATE: 97 BPM | RESPIRATION RATE: 18 BRPM | BODY MASS INDEX: 29.55 KG/M2 | DIASTOLIC BLOOD PRESSURE: 72 MMHG | OXYGEN SATURATION: 94 % | SYSTOLIC BLOOD PRESSURE: 104 MMHG

## 2025-08-24 DIAGNOSIS — R10.10 UPPER ABDOMINAL PAIN: Primary | ICD-10-CM

## 2025-08-24 DIAGNOSIS — R11.2 NAUSEA AND VOMITING, UNSPECIFIED VOMITING TYPE: ICD-10-CM

## 2025-08-24 LAB
ALBUMIN SERPL BCG-MCNC: 4 G/DL (ref 3.5–5.2)
ALP SERPL-CCNC: 114 U/L (ref 40–150)
ALT SERPL W P-5'-P-CCNC: 28 U/L (ref 0–70)
ANION GAP SERPL CALCULATED.3IONS-SCNC: 18 MMOL/L (ref 7–15)
APTT PPP: 23 SECONDS (ref 22–38)
AST SERPL W P-5'-P-CCNC: 41 U/L (ref 0–45)
BASOPHILS # BLD AUTO: 0.05 10E3/UL (ref 0–0.2)
BASOPHILS NFR BLD AUTO: 0.4 %
BILIRUB SERPL-MCNC: 0.8 MG/DL
BUN SERPL-MCNC: 43 MG/DL (ref 8–23)
CALCIUM SERPL-MCNC: 9.7 MG/DL (ref 8.8–10.4)
CHLORIDE SERPL-SCNC: 99 MMOL/L (ref 98–107)
CREAT SERPL-MCNC: 1.28 MG/DL (ref 0.67–1.17)
EGFRCR SERPLBLD CKD-EPI 2021: 61 ML/MIN/1.73M2
EOSINOPHIL # BLD AUTO: 0.07 10E3/UL (ref 0–0.7)
EOSINOPHIL NFR BLD AUTO: 0.5 %
ERYTHROCYTE [DISTWIDTH] IN BLOOD BY AUTOMATED COUNT: 16.3 % (ref 10–15)
GLUCOSE SERPL-MCNC: 168 MG/DL (ref 70–99)
HCO3 SERPL-SCNC: 14 MMOL/L (ref 22–29)
HCT VFR BLD AUTO: 37.4 % (ref 40–53)
HGB BLD-MCNC: 11.8 G/DL (ref 13.3–17.7)
IMM GRANULOCYTES # BLD: 0.04 10E3/UL
IMM GRANULOCYTES NFR BLD: 0.3 %
INR PPP: 1.07 (ref 0.85–1.15)
LIPASE SERPL-CCNC: 28 U/L (ref 13–60)
LYMPHOCYTES # BLD AUTO: 1.99 10E3/UL (ref 0.8–5.3)
LYMPHOCYTES NFR BLD AUTO: 14.1 %
MCH RBC QN AUTO: 25 PG (ref 26.5–33)
MCHC RBC AUTO-ENTMCNC: 31.6 G/DL (ref 31.5–36.5)
MCV RBC AUTO: 79.2 FL (ref 78–100)
MONOCYTES # BLD AUTO: 1.85 10E3/UL (ref 0–1.3)
MONOCYTES NFR BLD AUTO: 13.1 %
NEUTROPHILS # BLD AUTO: 10.15 10E3/UL (ref 1.6–8.3)
NEUTROPHILS NFR BLD AUTO: 71.6 %
NRBC # BLD AUTO: <0.03 10E3/UL
NRBC BLD AUTO-RTO: 0 /100
PLAT MORPH BLD: NORMAL
PLATELET # BLD AUTO: 284 10E3/UL (ref 150–450)
POTASSIUM SERPL-SCNC: 4.7 MMOL/L (ref 3.4–5.3)
PROT SERPL-MCNC: 8 G/DL (ref 6.4–8.3)
PROTHROMBIN TIME: 14 SECONDS (ref 11.8–14.8)
RBC # BLD AUTO: 4.72 10E6/UL (ref 4.4–5.9)
RBC MORPH BLD: NORMAL
SODIUM SERPL-SCNC: 131 MMOL/L (ref 135–145)
TROPONIN T SERPL HS-MCNC: 20 NG/L
TROPONIN T SERPL HS-MCNC: 21 NG/L
WBC # BLD AUTO: 14.15 10E3/UL (ref 4–11)

## 2025-08-24 PROCEDURE — 99291 CRITICAL CARE FIRST HOUR: CPT | Mod: 25 | Performed by: EMERGENCY MEDICINE

## 2025-08-24 PROCEDURE — 84484 ASSAY OF TROPONIN QUANT: CPT | Performed by: EMERGENCY MEDICINE

## 2025-08-24 PROCEDURE — 83690 ASSAY OF LIPASE: CPT | Performed by: EMERGENCY MEDICINE

## 2025-08-24 PROCEDURE — 36415 COLL VENOUS BLD VENIPUNCTURE: CPT | Performed by: EMERGENCY MEDICINE

## 2025-08-24 PROCEDURE — 82040 ASSAY OF SERUM ALBUMIN: CPT | Performed by: EMERGENCY MEDICINE

## 2025-08-24 PROCEDURE — 85025 COMPLETE CBC W/AUTO DIFF WBC: CPT | Performed by: EMERGENCY MEDICINE

## 2025-08-24 PROCEDURE — 96374 THER/PROPH/DIAG INJ IV PUSH: CPT

## 2025-08-24 PROCEDURE — 96361 HYDRATE IV INFUSION ADD-ON: CPT

## 2025-08-24 PROCEDURE — 85610 PROTHROMBIN TIME: CPT | Performed by: EMERGENCY MEDICINE

## 2025-08-24 PROCEDURE — 258N000003 HC RX IP 258 OP 636: Performed by: EMERGENCY MEDICINE

## 2025-08-24 PROCEDURE — 93005 ELECTROCARDIOGRAM TRACING: CPT

## 2025-08-24 PROCEDURE — 85730 THROMBOPLASTIN TIME PARTIAL: CPT | Performed by: EMERGENCY MEDICINE

## 2025-08-24 PROCEDURE — 250N000011 HC RX IP 250 OP 636: Performed by: EMERGENCY MEDICINE

## 2025-08-24 PROCEDURE — 96375 TX/PRO/DX INJ NEW DRUG ADDON: CPT

## 2025-08-24 RX ORDER — ONDANSETRON 2 MG/ML
4 INJECTION INTRAMUSCULAR; INTRAVENOUS ONCE
Status: COMPLETED | OUTPATIENT
Start: 2025-08-24 | End: 2025-08-24

## 2025-08-24 RX ORDER — MORPHINE SULFATE 4 MG/ML
4 INJECTION, SOLUTION INTRAMUSCULAR; INTRAVENOUS ONCE
Refills: 0 | Status: COMPLETED | OUTPATIENT
Start: 2025-08-24 | End: 2025-08-24

## 2025-08-24 RX ADMIN — PANTOPRAZOLE SODIUM 40 MG: 40 INJECTION, POWDER, FOR SOLUTION INTRAVENOUS at 19:34

## 2025-08-24 RX ADMIN — MORPHINE SULFATE 4 MG: 4 INJECTION, SOLUTION INTRAMUSCULAR; INTRAVENOUS at 19:35

## 2025-08-24 RX ADMIN — SODIUM CHLORIDE 1000 ML: 0.9 INJECTION, SOLUTION INTRAVENOUS at 19:33

## 2025-08-24 RX ADMIN — ONDANSETRON 4 MG: 2 INJECTION, SOLUTION INTRAMUSCULAR; INTRAVENOUS at 19:34

## 2025-08-24 ASSESSMENT — COLUMBIA-SUICIDE SEVERITY RATING SCALE - C-SSRS
2. HAVE YOU ACTUALLY HAD ANY THOUGHTS OF KILLING YOURSELF IN THE PAST MONTH?: NO
6. HAVE YOU EVER DONE ANYTHING, STARTED TO DO ANYTHING, OR PREPARED TO DO ANYTHING TO END YOUR LIFE?: NO
1. IN THE PAST MONTH, HAVE YOU WISHED YOU WERE DEAD OR WISHED YOU COULD GO TO SLEEP AND NOT WAKE UP?: NO

## 2025-08-24 ASSESSMENT — ACTIVITIES OF DAILY LIVING (ADL)
ADLS_ACUITY_SCORE: 47

## 2025-08-25 LAB
ATRIAL RATE - MUSE: 103 BPM
DIASTOLIC BLOOD PRESSURE - MUSE: NORMAL MMHG
INTERPRETATION ECG - MUSE: NORMAL
P AXIS - MUSE: 29 DEGREES
PR INTERVAL - MUSE: 132 MS
QRS DURATION - MUSE: 74 MS
QT - MUSE: 328 MS
QTC - MUSE: 429 MS
R AXIS - MUSE: 13 DEGREES
SYSTOLIC BLOOD PRESSURE - MUSE: NORMAL MMHG
T AXIS - MUSE: 100 DEGREES
VENTRICULAR RATE- MUSE: 103 BPM

## (undated) DEVICE — PREP CHLORAPREP 26ML TINTED ORANGE  260815

## (undated) DEVICE — LINEN HALF SHEET 5512

## (undated) DEVICE — GLOVE PROTEXIS BLUE W/NEU-THERA 8.0  2D73EB80

## (undated) DEVICE — SU VICRYL 2-0 TIE 12X18" J905T

## (undated) DEVICE — SYR 10ML SLIP TIP W/O NDL 303134

## (undated) DEVICE — LINEN FULL SHEET 5511

## (undated) DEVICE — SOL NACL 0.9% IRRIG 1000ML BOTTLE 2F7124

## (undated) DEVICE — CATH TRAY FOLEY COUDE SURESTEP 16FR W/DRN BAG LATEX A304416A

## (undated) DEVICE — SPONGE LAP 18X18" X8435

## (undated) DEVICE — SU VICRYL 0 TIE 12X18" J906G

## (undated) DEVICE — ESU ELEC BLADE 2.75" COATED/INSULATED E1455

## (undated) DEVICE — DRAIN JACKSON PRATT RESERVOIR 100ML SU130-1305

## (undated) DEVICE — KIT ENDO TURNOVER/PROCEDURE W/CLEAN A SCOPE LINERS 103888

## (undated) DEVICE — SU VICRYL 0 CT-1 36" J346H

## (undated) DEVICE — GLOVE PROTEXIS BLUE W/NEU-THERA 6.5  2D73EB65

## (undated) DEVICE — SU ETHILON 2-0 PS 18" 585H

## (undated) DEVICE — ENDO FORCEP ENDOJAW BIOPSY 2.8MMX230CM FB-220U

## (undated) DEVICE — DRAPE LAP W/ARMBOARD 29410

## (undated) DEVICE — Device

## (undated) DEVICE — DRAPE STERI INCISE 1050

## (undated) DEVICE — GOWN IMPERVIOUS ZONED XLG 9041

## (undated) DEVICE — SUCTION TIP POOLE K770

## (undated) DEVICE — LINEN TOWEL PACK X10 5473

## (undated) DEVICE — SU VICRYL 4-0 SH 27" UND J415H

## (undated) DEVICE — BAG CLEAR TRASH 1.3M 39X33" P4040C

## (undated) DEVICE — BLADE KNIFE SURG 15 371115

## (undated) DEVICE — NDL BLUNT 18GA 1" W/O FILTER 305181

## (undated) DEVICE — STPL SKIN 35W 6.9MM  PXW35

## (undated) DEVICE — SU VICRYL 3-0 SH 27" J316H

## (undated) DEVICE — SU PDS II 0 CTX 60" Z990G

## (undated) DEVICE — PACK MAJOR SBA15MAFSI

## (undated) DEVICE — GOWN IMPERVIOUS SPECIALTY XLG/XLONG 32474

## (undated) DEVICE — ENDO SNARE HEXAGONAL ISNARE 2.5X4.0CM W/25GA NDL

## (undated) DEVICE — ESU GROUND PAD ADULT W/CORD E7507

## (undated) DEVICE — GLOVE PROTEXIS POWDER FREE SMT 7.5  2D72PT75X

## (undated) DEVICE — DRSG GAUZE 4X4" 8044

## (undated) DEVICE — SU VICRYL 2-0 SH 27" J317H

## (undated) DEVICE — GLOVE PROTEXIS POWDER FREE 8.0 ORTHOPEDIC 2D73ET80

## (undated) DEVICE — SUCTION MANIFOLD NEPTUNE 2 SYS 4 PORT 0702-020-000

## (undated) RX ORDER — FENTANYL CITRATE 50 UG/ML
INJECTION, SOLUTION INTRAMUSCULAR; INTRAVENOUS
Status: DISPENSED
Start: 2019-05-16

## (undated) RX ORDER — ONDANSETRON 2 MG/ML
INJECTION INTRAMUSCULAR; INTRAVENOUS
Status: DISPENSED
Start: 2019-05-16

## (undated) RX ORDER — CEFAZOLIN SODIUM 1 G/3ML
INJECTION, POWDER, FOR SOLUTION INTRAMUSCULAR; INTRAVENOUS
Status: DISPENSED
Start: 2019-05-16

## (undated) RX ORDER — FENTANYL CITRATE 50 UG/ML
INJECTION, SOLUTION INTRAMUSCULAR; INTRAVENOUS
Status: DISPENSED
Start: 2025-07-18

## (undated) RX ORDER — GLYCOPYRROLATE 0.2 MG/ML
INJECTION INTRAMUSCULAR; INTRAVENOUS
Status: DISPENSED
Start: 2019-05-16

## (undated) RX ORDER — NEOSTIGMINE METHYLSULFATE 1 MG/ML
VIAL (ML) INJECTION
Status: DISPENSED
Start: 2019-05-16

## (undated) RX ORDER — KETAMINE HCL IN 0.9 % NACL 50 MG/5 ML
SYRINGE (ML) INTRAVENOUS
Status: DISPENSED
Start: 2019-05-16